# Patient Record
Sex: MALE | Race: WHITE | Employment: OTHER | URBAN - METROPOLITAN AREA
[De-identification: names, ages, dates, MRNs, and addresses within clinical notes are randomized per-mention and may not be internally consistent; named-entity substitution may affect disease eponyms.]

---

## 2022-04-04 ENCOUNTER — APPOINTMENT (OUTPATIENT)
Dept: CT IMAGING | Age: 82
DRG: 057 | End: 2022-04-04
Attending: PHYSICIAN ASSISTANT
Payer: MEDICARE

## 2022-04-04 ENCOUNTER — APPOINTMENT (OUTPATIENT)
Dept: GENERAL RADIOLOGY | Age: 82
DRG: 057 | End: 2022-04-04
Attending: EMERGENCY MEDICINE
Payer: MEDICARE

## 2022-04-04 ENCOUNTER — APPOINTMENT (OUTPATIENT)
Dept: GENERAL RADIOLOGY | Age: 82
DRG: 057 | End: 2022-04-04
Attending: PHYSICIAN ASSISTANT
Payer: MEDICARE

## 2022-04-04 ENCOUNTER — HOSPITAL ENCOUNTER (INPATIENT)
Age: 82
LOS: 2 days | Discharge: HOME HEALTH | DRG: 057 | End: 2022-04-06
Attending: EMERGENCY MEDICINE | Admitting: STUDENT IN AN ORGANIZED HEALTH CARE EDUCATION/TRAINING PROGRAM
Payer: MEDICARE

## 2022-04-04 DIAGNOSIS — I95.1 ORTHOSTATIC HYPOTENSION: Primary | ICD-10-CM

## 2022-04-04 DIAGNOSIS — I25.10 CORONARY ARTERY DISEASE INVOLVING NATIVE CORONARY ARTERY OF NATIVE HEART WITHOUT ANGINA PECTORIS: ICD-10-CM

## 2022-04-04 DIAGNOSIS — I10 ESSENTIAL HYPERTENSION: ICD-10-CM

## 2022-04-04 DIAGNOSIS — R00.1 BRADYCARDIA: ICD-10-CM

## 2022-04-04 PROBLEM — G47.00 INSOMNIA: Status: ACTIVE | Noted: 2022-04-04

## 2022-04-04 LAB
ALBUMIN SERPL-MCNC: 3 G/DL (ref 3.2–4.6)
ALBUMIN/GLOB SERPL: 0.9 (ref 1.2–3.5)
ALP SERPL-CCNC: 76 U/L (ref 50–136)
ALT SERPL-CCNC: 20 U/L (ref 12–65)
ANION GAP SERPL CALC-SCNC: 4 MMOL/L (ref 7–16)
AST SERPL-CCNC: 20 U/L (ref 15–37)
ATRIAL RATE: 61 BPM
BASOPHILS # BLD: 0 K/UL (ref 0–0.2)
BASOPHILS NFR BLD: 1 % (ref 0–2)
BILIRUB SERPL-MCNC: 0.2 MG/DL (ref 0.2–1.1)
BILIRUB UR QL: NEGATIVE
BUN SERPL-MCNC: 16 MG/DL (ref 8–23)
CALCIUM SERPL-MCNC: 8.7 MG/DL (ref 8.3–10.4)
CALCULATED P AXIS, ECG09: 87 DEGREES
CALCULATED R AXIS, ECG10: 79 DEGREES
CALCULATED T AXIS, ECG11: 35 DEGREES
CHLORIDE SERPL-SCNC: 106 MMOL/L (ref 98–107)
CO2 SERPL-SCNC: 30 MMOL/L (ref 21–32)
CREAT SERPL-MCNC: 1 MG/DL (ref 0.8–1.5)
DIAGNOSIS, 93000: NORMAL
DIFFERENTIAL METHOD BLD: ABNORMAL
EOSINOPHIL # BLD: 0.1 K/UL (ref 0–0.8)
EOSINOPHIL NFR BLD: 1 % (ref 0.5–7.8)
ERYTHROCYTE [DISTWIDTH] IN BLOOD BY AUTOMATED COUNT: 13.1 % (ref 11.9–14.6)
GLOBULIN SER CALC-MCNC: 3.5 G/DL (ref 2.3–3.5)
GLUCOSE SERPL-MCNC: 96 MG/DL (ref 65–100)
GLUCOSE UR QL STRIP.AUTO: NEGATIVE MG/DL
HCT VFR BLD AUTO: 38.4 % (ref 41.1–50.3)
HGB BLD-MCNC: 12.6 G/DL (ref 13.6–17.2)
IMM GRANULOCYTES # BLD AUTO: 0 K/UL (ref 0–0.5)
IMM GRANULOCYTES NFR BLD AUTO: 0 % (ref 0–5)
KETONES UR-MCNC: NEGATIVE MG/DL
LEUKOCYTE ESTERASE UR QL STRIP: NEGATIVE
LYMPHOCYTES # BLD: 1.2 K/UL (ref 0.5–4.6)
LYMPHOCYTES NFR BLD: 15 % (ref 13–44)
MAGNESIUM SERPL-MCNC: 2.4 MG/DL (ref 1.8–2.4)
MCH RBC QN AUTO: 34.1 PG (ref 26.1–32.9)
MCHC RBC AUTO-ENTMCNC: 32.8 G/DL (ref 31.4–35)
MCV RBC AUTO: 103.8 FL (ref 79.6–97.8)
MONOCYTES # BLD: 0.6 K/UL (ref 0.1–1.3)
MONOCYTES NFR BLD: 7 % (ref 4–12)
NEUTS SEG # BLD: 6.2 K/UL (ref 1.7–8.2)
NEUTS SEG NFR BLD: 76 % (ref 43–78)
NITRITE UR QL: NEGATIVE
NRBC # BLD: 0 K/UL (ref 0–0.2)
P-R INTERVAL, ECG05: 162 MS
PH UR: 6.5 (ref 5–9)
PLATELET # BLD AUTO: 285 K/UL (ref 150–450)
PMV BLD AUTO: 9.3 FL (ref 9.4–12.3)
POTASSIUM SERPL-SCNC: 4.1 MMOL/L (ref 3.5–5.1)
PROT SERPL-MCNC: 6.5 G/DL (ref 6.3–8.2)
PROT UR QL: NEGATIVE MG/DL
Q-T INTERVAL, ECG07: 429 MS
QRS DURATION, ECG06: 89 MS
QTC CALCULATION (BEZET), ECG08: 433 MS
RBC # BLD AUTO: 3.7 M/UL (ref 4.23–5.6)
RBC # UR STRIP: NEGATIVE
SERVICE CMNT-IMP: ABNORMAL
SODIUM SERPL-SCNC: 140 MMOL/L (ref 138–145)
SP GR UR: 1.02 (ref 1–1.02)
TROPONIN-HIGH SENSITIVITY: 7.3 PG/ML (ref 0–14)
UROBILINOGEN UR QL: 0.2 EU/DL (ref 0.2–1)
VENTRICULAR RATE, ECG03: 61 BPM
WBC # BLD AUTO: 8.1 K/UL (ref 4.3–11.1)

## 2022-04-04 PROCEDURE — 83735 ASSAY OF MAGNESIUM: CPT

## 2022-04-04 PROCEDURE — 85025 COMPLETE CBC W/AUTO DIFF WBC: CPT

## 2022-04-04 PROCEDURE — 80053 COMPREHEN METABOLIC PANEL: CPT

## 2022-04-04 PROCEDURE — 74011250636 HC RX REV CODE- 250/636: Performed by: PHYSICIAN ASSISTANT

## 2022-04-04 PROCEDURE — 70450 CT HEAD/BRAIN W/O DYE: CPT

## 2022-04-04 PROCEDURE — 83036 HEMOGLOBIN GLYCOSYLATED A1C: CPT

## 2022-04-04 PROCEDURE — 74019 RADEX ABDOMEN 2 VIEWS: CPT

## 2022-04-04 PROCEDURE — 99285 EMERGENCY DEPT VISIT HI MDM: CPT

## 2022-04-04 PROCEDURE — 65270000029 HC RM PRIVATE

## 2022-04-04 PROCEDURE — 84439 ASSAY OF FREE THYROXINE: CPT

## 2022-04-04 PROCEDURE — 81003 URINALYSIS AUTO W/O SCOPE: CPT

## 2022-04-04 PROCEDURE — 71045 X-RAY EXAM CHEST 1 VIEW: CPT

## 2022-04-04 PROCEDURE — 84484 ASSAY OF TROPONIN QUANT: CPT

## 2022-04-04 PROCEDURE — 74011000250 HC RX REV CODE- 250: Performed by: STUDENT IN AN ORGANIZED HEALTH CARE EDUCATION/TRAINING PROGRAM

## 2022-04-04 PROCEDURE — 94762 N-INVAS EAR/PLS OXIMTRY CONT: CPT

## 2022-04-04 PROCEDURE — 84443 ASSAY THYROID STIM HORMONE: CPT

## 2022-04-04 PROCEDURE — 74011250637 HC RX REV CODE- 250/637: Performed by: STUDENT IN AN ORGANIZED HEALTH CARE EDUCATION/TRAINING PROGRAM

## 2022-04-04 PROCEDURE — 93005 ELECTROCARDIOGRAM TRACING: CPT | Performed by: EMERGENCY MEDICINE

## 2022-04-04 PROCEDURE — 96360 HYDRATION IV INFUSION INIT: CPT

## 2022-04-04 RX ORDER — ONDANSETRON 4 MG/1
4 TABLET, ORALLY DISINTEGRATING ORAL
Status: DISCONTINUED | OUTPATIENT
Start: 2022-04-04 | End: 2022-04-06 | Stop reason: HOSPADM

## 2022-04-04 RX ORDER — SODIUM CHLORIDE 0.9 % (FLUSH) 0.9 %
5-10 SYRINGE (ML) INJECTION AS NEEDED
Status: DISCONTINUED | OUTPATIENT
Start: 2022-04-04 | End: 2022-04-04 | Stop reason: SDUPTHER

## 2022-04-04 RX ORDER — ONDANSETRON 2 MG/ML
4 INJECTION INTRAMUSCULAR; INTRAVENOUS
Status: DISCONTINUED | OUTPATIENT
Start: 2022-04-04 | End: 2022-04-06 | Stop reason: HOSPADM

## 2022-04-04 RX ORDER — ASPIRIN 81 MG/1
81 TABLET ORAL DAILY
Status: DISCONTINUED | OUTPATIENT
Start: 2022-04-05 | End: 2022-04-06 | Stop reason: HOSPADM

## 2022-04-04 RX ORDER — PRAVASTATIN SODIUM 20 MG/1
20 TABLET ORAL
Status: DISCONTINUED | OUTPATIENT
Start: 2022-04-04 | End: 2022-04-06 | Stop reason: HOSPADM

## 2022-04-04 RX ORDER — ACETAMINOPHEN 325 MG/1
650 TABLET ORAL
Status: DISCONTINUED | OUTPATIENT
Start: 2022-04-04 | End: 2022-04-06 | Stop reason: HOSPADM

## 2022-04-04 RX ORDER — CHOLECALCIFEROL TAB 125 MCG (5000 UNIT) 125 MCG
5000 TAB ORAL DAILY
Status: DISCONTINUED | OUTPATIENT
Start: 2022-04-05 | End: 2022-04-06 | Stop reason: HOSPADM

## 2022-04-04 RX ORDER — SODIUM CHLORIDE 0.9 % (FLUSH) 0.9 %
5-40 SYRINGE (ML) INJECTION EVERY 8 HOURS
Status: DISCONTINUED | OUTPATIENT
Start: 2022-04-04 | End: 2022-04-06 | Stop reason: HOSPADM

## 2022-04-04 RX ORDER — ZOLPIDEM TARTRATE 5 MG/1
5 TABLET ORAL
Status: DISCONTINUED | OUTPATIENT
Start: 2022-04-04 | End: 2022-04-06 | Stop reason: HOSPADM

## 2022-04-04 RX ORDER — ACETAMINOPHEN 650 MG/1
650 SUPPOSITORY RECTAL
Status: DISCONTINUED | OUTPATIENT
Start: 2022-04-04 | End: 2022-04-06 | Stop reason: HOSPADM

## 2022-04-04 RX ORDER — POLYETHYLENE GLYCOL 3350 17 G/17G
17 POWDER, FOR SOLUTION ORAL DAILY PRN
Status: DISCONTINUED | OUTPATIENT
Start: 2022-04-04 | End: 2022-04-06 | Stop reason: HOSPADM

## 2022-04-04 RX ORDER — SODIUM CHLORIDE 0.9 % (FLUSH) 0.9 %
5-40 SYRINGE (ML) INJECTION AS NEEDED
Status: DISCONTINUED | OUTPATIENT
Start: 2022-04-04 | End: 2022-04-06 | Stop reason: HOSPADM

## 2022-04-04 RX ORDER — FLUTICASONE PROPIONATE 50 MCG
2 SPRAY, SUSPENSION (ML) NASAL DAILY
Status: DISCONTINUED | OUTPATIENT
Start: 2022-04-05 | End: 2022-04-06 | Stop reason: HOSPADM

## 2022-04-04 RX ORDER — SODIUM CHLORIDE 0.9 % (FLUSH) 0.9 %
5-10 SYRINGE (ML) INJECTION EVERY 8 HOURS
Status: DISCONTINUED | OUTPATIENT
Start: 2022-04-04 | End: 2022-04-04 | Stop reason: SDUPTHER

## 2022-04-04 RX ORDER — FINASTERIDE 5 MG/1
5 TABLET, FILM COATED ORAL DAILY
Status: DISCONTINUED | OUTPATIENT
Start: 2022-04-05 | End: 2022-04-06 | Stop reason: HOSPADM

## 2022-04-04 RX ORDER — ENOXAPARIN SODIUM 100 MG/ML
40 INJECTION SUBCUTANEOUS DAILY
Status: DISCONTINUED | OUTPATIENT
Start: 2022-04-05 | End: 2022-04-06 | Stop reason: HOSPADM

## 2022-04-04 RX ADMIN — PRAVASTATIN SODIUM 20 MG: 20 TABLET ORAL at 22:33

## 2022-04-04 RX ADMIN — SODIUM CHLORIDE, PRESERVATIVE FREE 10 ML: 5 INJECTION INTRAVENOUS at 22:34

## 2022-04-04 RX ADMIN — SODIUM CHLORIDE 1000 ML: 900 INJECTION, SOLUTION INTRAVENOUS at 16:42

## 2022-04-04 NOTE — ED TRIAGE NOTES
Pt to triage with mask in place. Pt reports low blood pressure and dizziness. Pt report stopping lisinopril on 3/20 but has had persistent sx. Pt reports being orthostatic. Pt also reports some shob.

## 2022-04-04 NOTE — ED PROVIDER NOTES
Patient to ER with friend of the family who reports continued hypotension. Patient seen by primary care physician for same over the past 3 to 4 weeks. Blood pressure has been in the 90s. Blood pressure medications were discontinued. He is also having some slow weight loss. Patient states he still eats well. Denies any vomiting no diarrhea no constipation. Is awaiting referral to neurologist for some mild neurologic changes    The history is provided by the patient. Hypotension  This is a new problem. The current episode started more than 1 week ago. The problem occurs constantly. The problem has not changed since onset. Pertinent negatives include no chest pain, no abdominal pain, no headaches and no shortness of breath. The symptoms are aggravated by standing. The symptoms are relieved by rest. Treatments tried: Discontinued blood pressure medicines. The treatment provided no relief.         Past Medical History:   Diagnosis Date    Bradycardia     Chest pain, unspecified 2016    Coronary disease     Essential hypertension 2016    Lower extremity neuropathy     Mixed hyperlipidemia 2016    Myalgia        Past Surgical History:   Procedure Laterality Date    HX HEART CATHETERIZATION           Family History:   Problem Relation Age of Onset    Heart Attack Father 47       Social History     Socioeconomic History    Marital status:      Spouse name: Not on file    Number of children: Not on file    Years of education: Not on file    Highest education level: Not on file   Occupational History    Not on file   Tobacco Use    Smoking status: Former Smoker     Packs/day: 1.50     Years: 50.00     Pack years: 75.00     Quit date: 2012     Years since quittin.8    Smokeless tobacco: Never Used    Tobacco comment: smoked for 40 to 50 years, previously smoked 1 1/2 PPD, stopped 3 years ago   Substance and Sexual Activity    Alcohol use: No     Alcohol/week: 0.0 standard drinks    Drug use: Not on file    Sexual activity: Not on file   Other Topics Concern    Not on file   Social History Narrative    Not on file     Social Determinants of Health     Financial Resource Strain:     Difficulty of Paying Living Expenses: Not on file   Food Insecurity:     Worried About Running Out of Food in the Last Year: Not on file    Kwame of Food in the Last Year: Not on file   Transportation Needs:     Lack of Transportation (Medical): Not on file    Lack of Transportation (Non-Medical): Not on file   Physical Activity:     Days of Exercise per Week: Not on file    Minutes of Exercise per Session: Not on file   Stress:     Feeling of Stress : Not on file   Social Connections:     Frequency of Communication with Friends and Family: Not on file    Frequency of Social Gatherings with Friends and Family: Not on file    Attends Ep[employer] Services: Not on file    Active Member of [address] [place] [address] [employer] or Organizations: Not on file    Attends Club or Organization Meetings: Not on file    Marital Status: Not on file   Intimate Partner Violence:     Fear of Current or Ex-Partner: Not on file    Emotionally Abused: Not on file    Physically Abused: Not on file    Sexually Abused: Not on file   Housing Stability:     Unable to Pay for Housing in the Last Year: Not on file    Number of [place] in the Last Year: Not on file    Unstable Housing in the Last Year: Not on file         ALLERGIES: Patient has no known allergies. Review of Systems   Respiratory: Negative for shortness of breath. Cardiovascular: Negative for chest pain. Gastrointestinal: Negative for abdominal pain. Neurological: Negative for headaches. All other systems reviewed and are negative.       Vitals:    04/04/22 1422 04/04/22 1700   BP: 94/78 (!) 185/77   Pulse: 84 (!) 54   Resp: 20 22   Temp: 98.2 °F (36.8 °C)    SpO2: 100%    Weight: 68.9 kg (152 lb)    Height: 6' (1.829 m)             Physical Exam  Vitals and nursing note reviewed. Constitutional:       General: He is not in acute distress. Appearance: Normal appearance. He is well-developed. He is not diaphoretic. Comments: thin   HENT:      Head: Normocephalic and atraumatic. Right Ear: External ear normal.      Left Ear: External ear normal.      Nose: Nose normal.      Mouth/Throat:      Mouth: Mucous membranes are moist.   Eyes:      Pupils: Pupils are equal, round, and reactive to light. Cardiovascular:      Rate and Rhythm: Normal rate and regular rhythm. Pulmonary:      Effort: Pulmonary effort is normal.      Breath sounds: Normal breath sounds. No wheezing or rhonchi. Abdominal:      General: Abdomen is flat. Bowel sounds are normal.      Palpations: Abdomen is soft. There is no mass. Tenderness: There is abdominal tenderness. Hernia: No hernia is present. Comments: llq area    Musculoskeletal:         General: No swelling. Normal range of motion. Cervical back: Normal range of motion and neck supple. Skin:     General: Skin is warm. Neurological:      General: No focal deficit present. Mental Status: He is alert. Comments: Patient oriented to person and place.   Patient is easily agitated    Psychiatric:         Judgment: Judgment normal.          MDM  Number of Diagnoses or Management Options  Diagnosis management comments: Labs Reviewed  CBC WITH AUTOMATED DIFF - Abnormal; Notable for the following components:     RBC                           3.70 (*)               HGB                           12.6 (*)               HCT                           38.4 (*)               MCV                           103.8 (*)               MCH                           34.1 (*)               MPV                           9.3 (*)             All other components within normal limits  METABOLIC PANEL, COMPREHENSIVE - Abnormal; Notable for the following components:     Anion gap                     4 (*) Albumin                       3.0 (*)                A-G Ratio                     0.9 (*)             All other components within normal limits  POC URINE MACROSCOPIC - Abnormal; Notable for the following components:     Spec. gravity (POC)           1.025 (*)            All other components within normal limits  MAGNESIUM  TROPONIN-HIGH SENSITIVITY  XR ABD (AP AND ERECT OR DECUB)   Final Result    1. Nondistended bowel gas pattern     CT HEAD WO CONT   Final Result    Moderate cerebral volume loss and white matter findings compatible    with chronic small vessel ischemic disease. XR CHEST PORT   Final Result    Hyperexpanded lungs, otherwise no acute abnormality. Given 1 L normal saline urine dip negative. Repeat orthostatics patient remained symptomatic on standing.  Will  Discusse with hospitalist    EKG interpretation shows normal sinus rhythm at 82 bpm nonspecific ST-T wave changes noted no signs of acute MI       Amount and/or Complexity of Data Reviewed  Clinical lab tests: ordered and reviewed  Tests in the radiology section of CPT®: ordered and reviewed  Review and summarize past medical records: yes  Discuss the patient with other providers: yes  Independent visualization of images, tracings, or specimens: yes    Risk of Complications, Morbidity, and/or Mortality  Presenting problems: moderate  Diagnostic procedures: moderate  Management options: moderate    Patient Progress  Patient progress: stable         Procedures

## 2022-04-04 NOTE — Clinical Note
Status[de-identified] INPATIENT [101]   Type of Bed: Medical [8]   Cardiac Monitoring Required?: Yes   Inpatient Hospitalization Certified Necessary for the Following Reasons: 3. Patient receiving treatment that can only be provided in an inpatient setting (further clarification in H&P documentation)   Admitting Diagnosis: Orthostatic hypotension [458. 0. ICD-9-CM]   Admitting Physician: Ganga Carvalho [[id number]]   Attending Physician: Cherise Hinojosa   Estimated Length of Stay: 2 Midnights   Discharge Plan[de-identified] [facility]

## 2022-04-05 ENCOUNTER — APPOINTMENT (OUTPATIENT)
Dept: ULTRASOUND IMAGING | Age: 82
DRG: 057 | End: 2022-04-05
Attending: STUDENT IN AN ORGANIZED HEALTH CARE EDUCATION/TRAINING PROGRAM
Payer: MEDICARE

## 2022-04-05 ENCOUNTER — APPOINTMENT (OUTPATIENT)
Dept: NON INVASIVE DIAGNOSTICS | Age: 82
DRG: 057 | End: 2022-04-05
Attending: STUDENT IN AN ORGANIZED HEALTH CARE EDUCATION/TRAINING PROGRAM
Payer: MEDICARE

## 2022-04-05 PROBLEM — I25.10 CAD (CORONARY ARTERY DISEASE): Status: ACTIVE | Noted: 2022-04-05

## 2022-04-05 LAB
ALBUMIN SERPL-MCNC: 3.2 G/DL (ref 3.2–4.6)
ALBUMIN/GLOB SERPL: 0.9 (ref 1.2–3.5)
ALP SERPL-CCNC: 70 U/L (ref 50–136)
ALT SERPL-CCNC: 16 U/L (ref 12–65)
ANION GAP SERPL CALC-SCNC: 2 MMOL/L (ref 7–16)
APTT PPP: 33.5 SEC (ref 24.1–35.1)
AST SERPL-CCNC: 22 U/L (ref 15–37)
BASOPHILS # BLD: 0 K/UL (ref 0–0.2)
BASOPHILS NFR BLD: 1 % (ref 0–2)
BILIRUB SERPL-MCNC: 0.5 MG/DL (ref 0.2–1.1)
BUN SERPL-MCNC: 12 MG/DL (ref 8–23)
CALCIUM SERPL-MCNC: 8.7 MG/DL (ref 8.3–10.4)
CHLORIDE SERPL-SCNC: 105 MMOL/L (ref 98–107)
CO2 SERPL-SCNC: 33 MMOL/L (ref 21–32)
CREAT SERPL-MCNC: 0.7 MG/DL (ref 0.8–1.5)
DIFFERENTIAL METHOD BLD: ABNORMAL
ECHO AO ASC DIAM: 3.4 CM
ECHO AO ASCENDING AORTA INDEX: 1.91 CM/M2
ECHO AO ROOT DIAM: 3.7 CM
ECHO AO ROOT INDEX: 2.08 CM/M2
ECHO AV AREA PEAK VELOCITY: 2.6 CM2
ECHO AV AREA VTI: 2.7 CM2
ECHO AV AREA/BSA PEAK VELOCITY: 1.5 CM2/M2
ECHO AV AREA/BSA VTI: 1.5 CM2/M2
ECHO AV MEAN GRADIENT: 3 MMHG
ECHO AV MEAN VELOCITY: 0.8 M/S
ECHO AV PEAK GRADIENT: 7 MMHG
ECHO AV PEAK VELOCITY: 1.4 M/S
ECHO AV VELOCITY RATIO: 0.71
ECHO AV VTI: 28.3 CM
ECHO EST RA PRESSURE: 3 MMHG
ECHO IVC PROX: 1.5 CM
ECHO LA AREA 2C: 16.6 CM2
ECHO LA AREA 4C: 15.9 CM2
ECHO LA DIAMETER INDEX: 1.74 CM/M2
ECHO LA DIAMETER: 3.1 CM
ECHO LA MAJOR AXIS: 4.6 CM
ECHO LA MINOR AXIS: 4.4 CM
ECHO LA TO AORTIC ROOT RATIO: 0.84
ECHO LA VOL BP: 46 ML (ref 18–58)
ECHO LA VOL/BSA BIPLANE: 26 ML/M2 (ref 16–34)
ECHO LV E' LATERAL VELOCITY: 9 CM/S
ECHO LV E' SEPTAL VELOCITY: 8 CM/S
ECHO LV EDV A2C: 95 ML
ECHO LV EDV A4C: 87 ML
ECHO LV EDV INDEX A4C: 49 ML/M2
ECHO LV EDV NDEX A2C: 53 ML/M2
ECHO LV EJECTION FRACTION A2C: 50 %
ECHO LV EJECTION FRACTION A4C: 42 %
ECHO LV EJECTION FRACTION BIPLANE: 47 % (ref 55–100)
ECHO LV ESV A2C: 48 ML
ECHO LV ESV A4C: 50 ML
ECHO LV ESV INDEX A2C: 27 ML/M2
ECHO LV ESV INDEX A4C: 28 ML/M2
ECHO LV FRACTIONAL SHORTENING: 27 % (ref 28–44)
ECHO LV INTERNAL DIMENSION DIASTOLE INDEX: 2.7 CM/M2
ECHO LV INTERNAL DIMENSION DIASTOLIC: 4.8 CM (ref 4.2–5.9)
ECHO LV INTERNAL DIMENSION SYSTOLIC INDEX: 1.97 CM/M2
ECHO LV INTERNAL DIMENSION SYSTOLIC: 3.5 CM
ECHO LV IVSD: 0.9 CM (ref 0.6–1)
ECHO LV MASS 2D: 158.8 G (ref 88–224)
ECHO LV MASS INDEX 2D: 89.2 G/M2 (ref 49–115)
ECHO LV POSTERIOR WALL DIASTOLIC: 1 CM (ref 0.6–1)
ECHO LV RELATIVE WALL THICKNESS RATIO: 0.42
ECHO LVOT AREA: 3.5 CM2
ECHO LVOT AV VTI INDEX: 0.78
ECHO LVOT DIAM: 2.1 CM
ECHO LVOT MEAN GRADIENT: 2 MMHG
ECHO LVOT PEAK GRADIENT: 4 MMHG
ECHO LVOT PEAK VELOCITY: 1 M/S
ECHO LVOT STROKE VOLUME INDEX: 42.8 ML/M2
ECHO LVOT SV: 76.2 ML
ECHO LVOT VTI: 22 CM
ECHO MV A VELOCITY: 0.76 M/S
ECHO MV AREA VTI: 3.3 CM2
ECHO MV E DECELERATION TIME (DT): 298 MS
ECHO MV E VELOCITY: 0.52 M/S
ECHO MV E/A RATIO: 0.68
ECHO MV E/E' LATERAL: 5.78
ECHO MV E/E' RATIO (AVERAGED): 6.14
ECHO MV E/E' SEPTAL: 6.5
ECHO MV LVOT VTI INDEX: 1.06
ECHO MV MAX VELOCITY: 1 M/S
ECHO MV MEAN GRADIENT: 2 MMHG
ECHO MV MEAN VELOCITY: 0.6 M/S
ECHO MV PEAK GRADIENT: 4 MMHG
ECHO MV REGURGITANT PEAK GRADIENT: 180 MMHG
ECHO MV REGURGITANT PEAK VELOCITY: 6.7 M/S
ECHO MV REGURGITANT VTIA: 196 CM
ECHO MV VTI: 23.4 CM
ECHO PV ACCELERATION TIME (AT): 121 MS
ECHO PV MAX VELOCITY: 0.9 M/S
ECHO PV PEAK GRADIENT: 3 MMHG
ECHO RIGHT VENTRICULAR SYSTOLIC PRESSURE (RVSP): 31 MMHG
ECHO RV BASAL DIMENSION: 3.2 CM
ECHO RV INTERNAL DIMENSION: 3.8 CM
ECHO RV TAPSE: 1.8 CM (ref 1.5–2)
ECHO TV REGURGITANT MAX VELOCITY: 2.66 M/S
ECHO TV REGURGITANT PEAK GRADIENT: 28 MMHG
EOSINOPHIL # BLD: 0.1 K/UL (ref 0–0.8)
EOSINOPHIL NFR BLD: 2 % (ref 0.5–7.8)
ERYTHROCYTE [DISTWIDTH] IN BLOOD BY AUTOMATED COUNT: 13.1 % (ref 11.9–14.6)
EST. AVERAGE GLUCOSE BLD GHB EST-MCNC: 111 MG/DL
GLOBULIN SER CALC-MCNC: 3.4 G/DL (ref 2.3–3.5)
GLUCOSE SERPL-MCNC: 82 MG/DL (ref 65–100)
HBA1C MFR BLD: 5.5 % (ref 4.2–6.3)
HCT VFR BLD AUTO: 38.6 % (ref 41.1–50.3)
HGB BLD-MCNC: 12.8 G/DL (ref 13.6–17.2)
IMM GRANULOCYTES # BLD AUTO: 0 K/UL (ref 0–0.5)
IMM GRANULOCYTES NFR BLD AUTO: 0 % (ref 0–5)
INR PPP: 0.9
LYMPHOCYTES # BLD: 1.5 K/UL (ref 0.5–4.6)
LYMPHOCYTES NFR BLD: 22 % (ref 13–44)
MAGNESIUM SERPL-MCNC: 2.3 MG/DL (ref 1.8–2.4)
MCH RBC QN AUTO: 33.6 PG (ref 26.1–32.9)
MCHC RBC AUTO-ENTMCNC: 33.2 G/DL (ref 31.4–35)
MCV RBC AUTO: 101.3 FL (ref 79.6–97.8)
MONOCYTES # BLD: 0.5 K/UL (ref 0.1–1.3)
MONOCYTES NFR BLD: 7 % (ref 4–12)
NEUTS SEG # BLD: 4.6 K/UL (ref 1.7–8.2)
NEUTS SEG NFR BLD: 69 % (ref 43–78)
NRBC # BLD: 0 K/UL (ref 0–0.2)
PLATELET # BLD AUTO: 291 K/UL (ref 150–450)
PMV BLD AUTO: 10 FL (ref 9.4–12.3)
POTASSIUM SERPL-SCNC: 3.6 MMOL/L (ref 3.5–5.1)
PROT SERPL-MCNC: 6.6 G/DL (ref 6.3–8.2)
PROTHROMBIN TIME: 12.7 SEC (ref 12.6–14.5)
RBC # BLD AUTO: 3.81 M/UL (ref 4.23–5.6)
SODIUM SERPL-SCNC: 140 MMOL/L (ref 138–145)
T4 FREE SERPL-MCNC: 1 NG/DL (ref 0.78–1.46)
TSH SERPL DL<=0.005 MIU/L-ACNC: 0.88 UIU/ML (ref 0.36–3.74)
WBC # BLD AUTO: 6.7 K/UL (ref 4.3–11.1)

## 2022-04-05 PROCEDURE — 85025 COMPLETE CBC W/AUTO DIFF WBC: CPT

## 2022-04-05 PROCEDURE — 99222 1ST HOSP IP/OBS MODERATE 55: CPT | Performed by: INTERNAL MEDICINE

## 2022-04-05 PROCEDURE — 93880 EXTRACRANIAL BILAT STUDY: CPT

## 2022-04-05 PROCEDURE — 83521 IG LIGHT CHAINS FREE EACH: CPT

## 2022-04-05 PROCEDURE — 82784 ASSAY IGA/IGD/IGG/IGM EACH: CPT

## 2022-04-05 PROCEDURE — 36415 COLL VENOUS BLD VENIPUNCTURE: CPT

## 2022-04-05 PROCEDURE — 65270000029 HC RM PRIVATE

## 2022-04-05 PROCEDURE — 93306 TTE W/DOPPLER COMPLETE: CPT

## 2022-04-05 PROCEDURE — 97161 PT EVAL LOW COMPLEX 20 MIN: CPT

## 2022-04-05 PROCEDURE — 80053 COMPREHEN METABOLIC PANEL: CPT

## 2022-04-05 PROCEDURE — 74011000250 HC RX REV CODE- 250: Performed by: INTERNAL MEDICINE

## 2022-04-05 PROCEDURE — 97165 OT EVAL LOW COMPLEX 30 MIN: CPT

## 2022-04-05 PROCEDURE — 74011000250 HC RX REV CODE- 250: Performed by: STUDENT IN AN ORGANIZED HEALTH CARE EDUCATION/TRAINING PROGRAM

## 2022-04-05 PROCEDURE — 83735 ASSAY OF MAGNESIUM: CPT

## 2022-04-05 PROCEDURE — 74011250637 HC RX REV CODE- 250/637: Performed by: STUDENT IN AN ORGANIZED HEALTH CARE EDUCATION/TRAINING PROGRAM

## 2022-04-05 PROCEDURE — 86580 TB INTRADERMAL TEST: CPT | Performed by: INTERNAL MEDICINE

## 2022-04-05 PROCEDURE — 85730 THROMBOPLASTIN TIME PARTIAL: CPT

## 2022-04-05 PROCEDURE — 97116 GAIT TRAINING THERAPY: CPT

## 2022-04-05 PROCEDURE — 74011250636 HC RX REV CODE- 250/636: Performed by: STUDENT IN AN ORGANIZED HEALTH CARE EDUCATION/TRAINING PROGRAM

## 2022-04-05 PROCEDURE — 97535 SELF CARE MNGMENT TRAINING: CPT

## 2022-04-05 PROCEDURE — 85610 PROTHROMBIN TIME: CPT

## 2022-04-05 PROCEDURE — 74011250637 HC RX REV CODE- 250/637: Performed by: INTERNAL MEDICINE

## 2022-04-05 RX ORDER — ISOSORBIDE DINITRATE 10 MG/1
5 TABLET ORAL
Status: DISCONTINUED | OUTPATIENT
Start: 2022-04-05 | End: 2022-04-06 | Stop reason: HOSPADM

## 2022-04-05 RX ORDER — ISOSORBIDE DINITRATE 10 MG/1
10 TABLET ORAL
Status: DISCONTINUED | OUTPATIENT
Start: 2022-04-05 | End: 2022-04-05

## 2022-04-05 RX ADMIN — SODIUM CHLORIDE, PRESERVATIVE FREE 10 ML: 5 INJECTION INTRAVENOUS at 21:05

## 2022-04-05 RX ADMIN — PRAVASTATIN SODIUM 20 MG: 20 TABLET ORAL at 21:05

## 2022-04-05 RX ADMIN — FINASTERIDE 5 MG: 5 TABLET, FILM COATED ORAL at 08:45

## 2022-04-05 RX ADMIN — SODIUM CHLORIDE, PRESERVATIVE FREE 10 ML: 5 INJECTION INTRAVENOUS at 15:03

## 2022-04-05 RX ADMIN — FLUTICASONE PROPIONATE 2 SPRAY: 50 SPRAY, METERED NASAL at 08:46

## 2022-04-05 RX ADMIN — ISOSORBIDE DINITRATE 5 MG: 10 TABLET ORAL at 21:05

## 2022-04-05 RX ADMIN — ASPIRIN 81 MG: 81 TABLET ORAL at 08:45

## 2022-04-05 RX ADMIN — SODIUM CHLORIDE, PRESERVATIVE FREE 10 ML: 5 INJECTION INTRAVENOUS at 06:54

## 2022-04-05 RX ADMIN — POLYETHYLENE GLYCOL 3350 17 G: 17 POWDER, FOR SOLUTION ORAL at 16:58

## 2022-04-05 RX ADMIN — Medication 5000 UNITS: at 08:46

## 2022-04-05 RX ADMIN — ENOXAPARIN SODIUM 40 MG: 40 INJECTION SUBCUTANEOUS at 08:46

## 2022-04-05 RX ADMIN — TUBERCULIN PURIFIED PROTEIN DERIVATIVE 5 UNITS: 5 INJECTION, SOLUTION INTRADERMAL at 15:57

## 2022-04-05 NOTE — H&P
[facility] Initial Cardiac Evaluation                 Date of  Admission: 4/4/2022  4:01 PM     Primary Care Physician: Chandan Duron MD  Primary Cardiologist: Dr Gordon Velasquez  Referring Physician: Dr Indira Jorge  Attending Physician: Dr Aureliano Woodall    CC: orthostatic hypotension       Marylene Lark is a 80 y.o. male admitted for Orthostatic hypotension [I95.1]. History of mild to moderate coronary disease. Noted heart cath in 2009 with some ostial diagonal disease (~50%), mild to moderate mid LAD disease [test done for reported positive stress/chest pain]. Has had a subsequent Cardiolite which was negative in 2011. Also prior seen by neurology for LE neuropathy and had a CHOCO noted per records done with an abnormal MRI per patient; stated possible CVA (no sx to suggest it). 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 Prior myalgias with vytorin but tolerating pravastatin; thinks some increased sx with increasing dose prior. Echo noted from [facility] with reported preserved EF and stated mild mitral stenosis [[place]; pressure halftime/gradients noted would not be consistent;  8/18]; repeat echo here with preserved EF and mild to moderate MR; no noted mitral stenosis [3/20]. Noted Cardiolite at [facility] with fixed inferior defect [1/2020; appears presented there with left arm pain]. Pt w orthostatic symptoms but lisinopril dose decreased and improved. H/O orthostatic hypotension, neuropathy w monoclonal MGUS    1-2 weeks ago pt got more dizziness, only w sitting up or standing, felt light headed and wobbly. No syncope. No symptoms at rest.  Tries to stand slowly. Drinking well, no weight loss, no N/V or diarrhea. No CP, SOB or LE edema. Rare palpitations. Just now laying BP was 174/84, sitting up /57, no symptoms with this drop today. Presented to the ER 4-4 w dizziness and hypotension. Lisinopril stopped 3-20-22.   In ER SBP dropped form 180 to 90 with standing. CBC and CMP wnl, HS trop 7, TSH .8.  /77, EKG NSR w rate 82 w NSST/T wave changes. On  Monitor remains NSR. Given a liter of fluids, today BP laying 174/84 sitting up dropped to 110/57. No meds for hypotension, never worn support hose.      Patient Active Problem List   Diagnosis Code    Mixed hyperlipidemia E78.2    Essential hypertension I10    Chest pain, unspecified R07.9    Orthostatic hypotension I95.1    Bradycardia R00.1    Insomnia G47.00       Past Medical History:   Diagnosis Date    Bradycardia     Chest pain, unspecified 2016    Coronary disease     Essential hypertension 2016    Lower extremity neuropathy     Mixed hyperlipidemia 2016    Myalgia       Past Surgical History:   Procedure Laterality Date    HX HEART CATHETERIZATION       No Known Allergies   Family History   Problem Relation Age of Onset    Heart Attack Father 47      Social History     Tobacco Use    Smoking status: Former Smoker     Packs/day: 1.50     Years: 50.00     Pack years: 75.00     Quit date: 2012     Years since quittin.8    Smokeless tobacco: Never Used    Tobacco comment: smoked for 40 to 50 years, previously smoked 1 1/2 PPD, stopped 3 years ago   Substance Use Topics    Alcohol use: No     Alcohol/week: 0.0 standard drinks        Current Facility-Administered Medications   Medication Dose Route Frequency    aspirin delayed-release tablet 81 mg  81 mg Oral DAILY    cholecalciferol (VITAMIN D3) (5000 Units/125 mcg) tablet 5,000 Units  5,000 Units Oral DAILY    finasteride (PROSCAR) tablet 5 mg  5 mg Oral DAILY    fluticasone propionate (FLONASE) 50 mcg/actuation nasal spray 2 Spray  2 Spray Both Nostrils DAILY    pravastatin (PRAVACHOL) tablet 20 mg  20 mg Oral QHS    zolpidem (AMBIEN) tablet 5 mg  5 mg Oral QHS PRN    sodium chloride (NS) flush 5-40 mL  5-40 mL IntraVENous Q8H    sodium chloride (NS) flush 5-40 mL  5-40 mL IntraVENous PRN    acetaminophen (TYLENOL) tablet 650 mg  650 mg Oral Q6H PRN    Or    acetaminophen (TYLENOL) suppository 650 mg  650 mg Rectal Q6H PRN    polyethylene glycol (MIRALAX) packet 17 g  17 g Oral DAILY PRN    ondansetron (ZOFRAN ODT) tablet 4 mg  4 mg Oral Q8H PRN    Or    ondansetron (ZOFRAN) injection 4 mg  4 mg IntraVENous Q6H PRN    enoxaparin (LOVENOX) injection 40 mg  40 mg SubCUTAneous DAILY       Review of Symptoms:  General: no weight change,  + wobbly/ weakness, fever or chills  Skin: no rashes, lumps, or other skin changes  HEENT: no headache, dizziness, lightheadedness, vision changes, hearing changes, tinnitus, vertigo, sinus pressure/pain, bleeding gums, sore throat, or hoarseness  Neck: no swollen glands, goiter, pain or stiffness  Respiratory: no cough, sputum, hemoptysis, no dyspnea, wheezing  Cardiovascular: + as per HPI  Gastrointestinal: + constipation   Urinary: + BPH  Peripheral Vascular: no claudication, leg cramps, prior DVTs, swelling of calves, legs, or feet, color change, or swelling with redness or tenderness  Musculoskeletal: no muscle or joint pain/stiffness, joint swelling, erythema of joints, or back pain  Psychiatric: no depression or excessive stress  Neurological: + neuropathy   Hematologic: no anemia, easy bruising or bleeding  Endocrine: no thyroid problems, heat or cold intolerance, excessive sweating, polyuria, polydipsia, no  diabetes.        Physical Exam  Vitals:    04/04/22 2015 04/04/22 2104 04/04/22 2316 04/05/22 0317   BP: (!) 154/78 (!) 147/94 136/65 134/77   Pulse:  72 66 75   Resp:  16 16 16   Temp:  97.8 °F (36.6 °C) 97.7 °F (36.5 °C) 97.5 °F (36.4 °C)   SpO2:  100% 99% 99%   Weight:  59.6 kg (131 lb 6.4 oz)     Height:           Physical Exam:  General: Well Developed, Well Nourished, No Acute Distress, frail, appears stated age   Head: normocephalic atraumatic   ENT: pupils equal and round, no abnormalities noted  Neck: supple, no JVD, no carotid bruits  Heart: S1S2 with RRR   Lungs: Clear throughout auscultation bilaterally without adventitious sounds  Abd: soft, nontender, nondistended, with good bowel sounds  Ext: warm, no edema  Skin: warm and dry  Psychiatric: Normal mood and affect, A&O x 3  Neurologic: normal muscle tone      Cardiographics    Telemetry: NSR     Labs:   Recent Labs     04/04/22  1430      K 4.1   MG 2.4   BUN 16   CREA 1.00   GLU 96   WBC 8.1   HGB 12.6*   HCT 38.4*           Assessment/Plan:     Assessment:   Orthostatic hypotension (4/4/2022)- continued symptoms despite hydration and stopping ACE  - add support hose  - encourage hydration  - consider midodrine if still symptomatic  - discussed conservative measures, staying hydrated, standing slowly  - may need to stop FINASTERIDE if able     Mixed hyperlipidemia (5/19/2016)- statin    Essential hypertension (5/19/2016)- permissive hypertension due to severe orthostatic symptoms     Bradycardia (4/4/2022)- no significant bradycardia on tele, monitor    Insomnia (4/4/2022)    CAD (coronary artery disease) (4/5/2022)- cath 2009 mild - mod disease  - ASA, statin     Thank you very much for this referral. We appreciate the opportunity to participate in this patient's care. We will follow along with above stated plan.     Sherry Lr PA-C  Consulting MD: Radha Whiting

## 2022-04-05 NOTE — PROGRESS NOTES
04/04/22 2223   Dual Skin Pressure Injury Assessment   Dual Skin Pressure Injury Assessment WDL   Second Care Provider (Based on [address] [place] [address]) Arvin Curling, RN      Patients skin was intact and there are no apparent problems at this time.

## 2022-04-05 NOTE — PROGRESS NOTES
ACUTE PHYSICAL THERAPY GOALS:  (Developed with and agreed upon by patient and/or caregiver. Goals:  (1.)Mr. Hardy Begum will ambulate with SUPERVISION for 300 feet with the least restrictive device within 2 treatment day(s). (2.)Mr. Hardy Begum will participate in seated and standing exercises to improve strength and steadiness with gait within 2 day(s)    PHYSICAL THERAPY ASSESSMENT: Initial Assessment, Daily Note and AM PT Treatment Day # 1      Cassie Flowers is a 80 y.o. male   PRIMARY DIAGNOSIS: Orthostatic hypotension  Orthostatic hypotension [I95.1]       Reason for Referral:    ICD-10: Treatment Diagnosis: Difficulty in walking, Not elsewhere classified (R26.2)  [place] / [place] / Product Type: Medicare /     ASSESSMENT:     REHAB RECOMMENDATIONS:   Recommendation to date pending progress:  Setting:   No further skilled therapy after discharge from hospital  Equipment:    None     PRIOR LEVEL OF FUNCTION:  (Prior to Hospitalization) INITIAL/CURRENT LEVEL OF FUNCTION:  (Most Recently Demonstrated)   Bed Mobility:   Independent  Sit to Stand:   Independent  Transfers:   Independent  Gait/Mobility:   Independent Bed Mobility:   Independent  Sit to Stand:   Supervision  Transfers:   Supervision  Gait/Mobility:  Renzo Shell Assistance     ASSESSMENT:  Mr. Hardy Begum is a 80year old WM with an admitting diagnosis of orthostatic hypotension. His PMH includes orthostatic intolerance, bradycardia, hypertension, hyperlipidemia and Ménière's disease. He presents today sitting up in the bed agreeable to have therapy. He appears slightly anxious and had trouble hearing information and he gets confused during conversation. He states he feels good and has no trouble getting around. He moves himself well in the bed and transitioned to the EOB with supervision. He stood with Supervision and ambulated in the room for 65' without an assistive device with SBA.   He tried the bedside chair but did find it comfortable and elected tot return to his bed. He was kind and cooperative with therapy today but PT is uncertain how well he functions at home with his wife and her medical issues? Mr. Alicia Quinones would benefit from continued skilled PT while in the hospital for increased activity. SUBJECTIVE:   Mr. Alicia Quinones states, \"I am doing just fine and don't really know why I need to be here\"    SOCIAL HISTORY/LIVING ENVIRONMENT:   Home Environment: Private residence  # Steps to Enter: 2  One/Two Story Residence: One story  Living Alone: No  Support Systems: Spouse/Significant Other  OBJECTIVE:     PAIN: VITAL SIGNS: LINES/DRAINS:   Pre Treatment: Pain Screen  Pain Scale 1: Numeric (0 - 10)  Pain Intensity 1: 0  Post Treatment: 0/10     Visit Vitals  BP (!) 174/84 (BP 1 Location: Right arm, BP Patient Position: Supine)   Pulse 68   Temp 97.5 °F (36.4 °C)   Resp 20   Ht 6' (1.829 m)   Wt 59.6 kg (131 lb 6.4 oz)   SpO2 96%   BMI 17.82 kg/m²      O2 Device: None (Room air)     GROSS EVALUATION:   Within Functional Limits Abnormal/ Functional Abnormal/ Non-Functional (see comments) Not Tested Comments:   AROM [x] [] [] []    PROM [x] [] [] []    Strength [x] [] [] []    Balance [x] [] [] []    Posture [x] [] [] [] Slightly stooped standing posture   Sensation [x] [] [] []    Coordination [x] [] [] []    Tone [x] [] [] []    Edema [x] [] [] []    Activity Tolerance [x] [] [] []     [] [] [] []      COGNITION/  PERCEPTION: Intact Impaired   (see comments) Comments:   Orientation [x] [] To name and place but gets confused in conversation at times.    Vision [x] []    Hearing [x] []    Command Following [x] []    Safety Awareness [x] []     [] []      MOBILITY: I Mod I S SBA CGA Min Mod Max Total  NT x2 Comments:   Bed Mobility    Rolling [x] [] [] [] [] [] [] [] [] [] []    Supine to Sit [x] [] [] [] [] [] [] [] [] [] []    Scooting [x] [] [] [] [] [] [] [] [] [] []    Sit to Supine [x] [] [] [] [] [] [] [] [] [] []    Transfers    Sit to Stand [] [] [x] [] [] [] [] [] [] [] []    Bed to Chair [] [] [x] [] [] [] [] [] [] [] []    Stand to Sit [] [] [x] [] [] [] [] [] [] [] []    I=Independent, Mod I=Modified Independent, S=Supervision, SBA=Standby Assistance, CGA=Contact Guard Assistance,   Min=Minimal Assistance, Mod=Moderate Assistance, Max=Maximal Assistance, Total=Total Assistance, NT=Not Tested  GAIT: I Mod I S SBA CGA Min Mod Max Total  NT x2 Comments:   Level of Assistance [] [] [] [x] [] [] [] [] [] [] []    Distance 72' in room     DME None    Gait Quality Short steps, steady gait, stooped posture    Weightbearing Status N/A     I=Independent, Mod I=Modified Independent, S=Supervision, SBA=Standby Assistance, CGA=Contact Guard Assistance,   Min=Minimal Assistance, Mod=Moderate Assistance, Max=Maximal Assistance, Total=Total Assistance, NT=Not Tested    [address] [place] [address] [facility]       How much difficulty does the patient currently have. .. Unable A Lot A Little None   1. Turning over in bed (including adjusting bedclothes, sheets and blankets)? [] 1   [] 2   [] 3   [x] 4   2. Sitting down on and standing up from a chair with arms ( e.g., wheelchair, bedside commode, etc.)   [] 1   [] 2   [] 3   [x] 4   3. Moving from lying on back to sitting on the side of the bed? [] 1   [] 2   [] 3   [x] 4   How much help from another person does the patient currently need. .. Total A Lot A Little None   4. Moving to and from a bed to a chair (including a wheelchair)? [] 1   [] 2   [x] 3   [] 4   5. Need to walk in hospital room? [] 1   [] 2   [x] 3   [] 4   6. Climbing 3-5 steps with a railing? [] 1   [] 2   [x] 3   [] 4   © 2007, Trustees of [address] [place] [id number], under license to [employer].  All rights reserved     Score:  Initial: 21 Most Recent: X (Date: -- )    Interpretation of Tool:  Represents activities that are increasingly more difficult (i.e. Bed mobility, Transfers, Gait). PLAN:   FREQUENCY/DURATION: PT Plan of Care: 2 times/week for duration of hospital stay or until stated goals are met, whichever comes first.    PROBLEM LIST:   (Skilled intervention is medically necessary to address:)  1. Decreased ADL/Functional Activities  2. Decreased Activity Tolerance  3. Decreased Gait Ability  4. Decreased Strength   INTERVENTIONS PLANNED:   (Benefits and precautions of physical therapy have been discussed with the patient.)  1. Therapeutic Activity  2. Therapeutic Exercise/HEP  3. Neuromuscular Re-education  4. Gait Training  5. Education     TREATMENT:     EVALUATION: Low Complexity : (Untimed Charge)    TREATMENT:   ($$ Gait Trainin-22 mins    )  Gait Training (23 Minutes): Gait training for 65 feet utilizing None. Patient required Manual and Verbal cueing to improve Gait Mechanics.      AFTER TREATMENT POSITION/PRECAUTIONS:  Bed, Needs within reach and RN notified - tried sitting in the bedside chair and he did not like it as he is tall and his legs don't fit too well     INTERDISCIPLINARY COLLABORATION:  RN/PCT    TOTAL TREATMENT DURATION:  PT Patient Time In/Time Out  Time In: 1120  Time Out: Tracee Gordon, PT

## 2022-04-05 NOTE — PROGRESS NOTES
TRANSFER - IN REPORT:    Verbal report received from Josias Fam RN on Nithin Mcguire  being received from ED for routine progression of care      Report consisted of patients Situation, Background, Assessment and   Recommendations(SBAR). Information from the following report(s) SBAR was reviewed with the receiving nurse. Opportunity for questions and clarification was provided. Assessment completed upon patients arrival to unit and care assumed.

## 2022-04-05 NOTE — PROGRESS NOTES
END OF SHIFT NOTE:    Intake/Output  04/04 1901 - 04/05 0700  In: 360 [P.O.:360]  Out: 750 [Urine:750]   Voiding: YES  Catheter: NO  Drain:              Stool:  0occurrences. Emesis:  0 occurrences. VITAL SIGNS  Patient Vitals for the past 12 hrs:   Temp Pulse Resp BP SpO2   04/05/22 0317 97.5 °F (36.4 °C) 75 16 134/77 99 %   04/04/22 2316 97.7 °F (36.5 °C) 66 16 136/65 99 %   04/04/22 2104 97.8 °F (36.6 °C) 72 16 (!) 147/94 100 %   04/04/22 2015    (!) 154/78        Pain Assessment  Pain 1  Pain Scale 1: Numeric (0 - 10) (04/05/22 0410)  Pain Intensity 1: 0 (04/05/22 0410)  Patient Stated Pain Goal: 0 (04/05/22 0410)    Ambulating  YES    Additional Information: Patient resting quietly and patients vitals are stable. Shift report given to oncoming nurse at the bedside.     Tom Schulte RN

## 2022-04-05 NOTE — PROGRESS NOTES
Received pt from [facility]) in stable condition. Pt in bed resting quietly. Resp even & unlabored on room air; no acute signs of distress noted. Bed low & locked; call light in reach; no needs voiced.

## 2022-04-05 NOTE — PROGRESS NOTES
Hospitalist Progress Note   Admit Date:  2022  4:01 PM   Name:  Nithin Mcguire   Age:  80 y.o. Sex:  male  :  1940   MRN:  [id number]   Room:  [facility]/    Presenting Complaint: Hypotension    Reason(s) for Admission: Orthostatic hypotension [I95.1]     Hospital Course & Interval History:     Nithin Mcguire is a 80 y.o. male with medical history of orthostatic intolerance, bradycardia, hypertension, hyperlipidemia, Ménière's disease who presented with episodes of hypotension. Patient follows up with cardiology outpatient. Back in 2021 patient had documentation of low blood pressures with dizziness. Patient admitted to low blood pressure and dizziness today. Patient has been advised to stop taking his lisinopril since 3/20/2022 but has continued to have persistent symptoms. Patient's symptoms exacerbated when standing up to walk anywhere. Patient denies vertigo, fainting, disequilibrium. Patient denied any cardiac chest pain, shortness of breath, abdominal pain, fever/chills. Blood pressure on arrival of 185/77. Orthostatics positive with blood pressure dropping to systolic 46L. CT head showed chronic small vessel disease. X-ray of abdomen pelvis showed nondistended bowel gas pattern. EKG normal sinus rhythm. Subjective/24hr Events (22): Patient examined at bedside. No acute overnight events. Vertigo symptoms have improved. Denies fevers/chills, chest pain, shortness of breath. Denies abdominal pain, nausea/vomiting or diarrhea. ROS:  10 systems reviewed and negative except as noted above.      Assessment & Plan:     Principal Problem:    Orthostatic hypotension (2022)  - NO INCREASE in HR means it is likely neurologic (most likely autonomic) or cardiac in etiology   - chronic and has been evaluated as an outpatient  - does NOT have compensatory increase in HRs during orthostatic vitals  - has supine hypertension   - stop finasteride (implemented in [facility] [place])  - there's some studies that show that short-acting nitrates at nighttime might help with supine hypertension without causing rebound [place] ([email or web address])  - added Isordil 5 mg po qHS and monitor response  - agree with compression stockings  - monitor serial BPs  - hold lisinopril as it is longer activing  - hold home Flomax  - TTE pending  - carotid US unrevealing for acute etiology     Active Problems:    Mixed hyperlipidemia (5/19/2016)  - statin      Essential hypertension (5/19/2016)   - as above    F/E/N: no fluids, replete electrolytes as needed, regular diet    Ppx: Lovenox for VTE    Code Status: FULL CODE    Disposition: pending clinical improvement with plan as above. PT/OT consults and PPD ordered. Hopefully discharge in the next 1-2 days. Discussed with patient at bedside. All questions answered.      Hospital Problems as of 4/5/2022 Date Reviewed: 11/3/2021          Codes Class Noted - Resolved POA    CAD (coronary artery disease) ICD-10-CM: I25.10  ICD-9-CM: 414.00  4/5/2022 - Present Unknown        * (Principal) Orthostatic hypotension ICD-10-CM: I95.1  ICD-9-CM: 458.0  4/4/2022 - Present Unknown        Bradycardia ICD-10-CM: R00.1  ICD-9-CM: 427.89  4/4/2022 - Present Unknown        Insomnia ICD-10-CM: G47.00  ICD-9-CM: 780.52  4/4/2022 - Present Unknown        Mixed hyperlipidemia ICD-10-CM: E78.2  ICD-9-CM: 272.2  5/19/2016 - Present Yes        Essential hypertension ICD-10-CM: I10  ICD-9-CM: 401.9  5/19/2016 - Present Yes              Objective:     Patient Vitals for the past 24 hrs:   Temp Pulse Resp BP SpO2   04/05/22 1157 97.7 °F (36.5 °C) 65 20 (!) 163/93 98 %   04/05/22 0838 97.5 °F (36.4 °C) 68 20 (!) 174/84 96 %   04/05/22 0317 97.5 °F (36.4 °C) 75 16 134/77 99 %   04/04/22 2316 97.7 °F (36.5 °C) 66 16 136/65 99 %   04/04/22 2104 97.8 °F (36.6 °C) 72 16 (!) 147/94 100 %   04/04/22 2015    (!) 154/78    04/04/22 1828  85  95/64    04/04/22 1827  79  (!) 175/85    04/04/22 1825  67  (!) 187/86    04/04/22 1700  (!) 54 22 (!) 185/77      Oxygen Therapy  O2 Sat (%): 98 % (04/05/22 1157)  O2 Device: None (Room air) (04/05/22 0410)    Estimated body mass index is 17.82 kg/m² as calculated from the following:    Height as of this encounter: 6' (1.829 m). Weight as of this encounter: 59.6 kg (131 lb 6.4 oz). Intake/Output Summary (Last 24 hours) at 4/5/2022 1512  Last data filed at 4/5/2022 1157  Gross per 24 hour   Intake 1580 ml   Output 1300 ml   Net 280 ml         Physical Exam:     Blood pressure (!) 163/93, pulse 65, temperature 97.7 °F (36.5 °C), resp. rate 20, height 6' (1.829 m), weight 59.6 kg (131 lb 6.4 oz), SpO2 98 %. General:    Well nourished. No overt distress  Head:  Normocephalic, atraumatic  Eyes:  Sclerae appear normal.  Pupils equally round. ENT:  Nares appear normal, no drainage. Moist oral mucosa  Neck:  No restricted ROM. Trachea midline   CV:   RRR. No jugular venous distension. Lungs:   CTAB. No wheezing, rhonchi, or rales. Respirations even, unlabored  Abdomen: Bowel sounds present. Soft, nontender, nondistended. Extremities: No cyanosis or clubbing. No edema  Skin:     No rashes and normal coloration. Warm and dry. Neuro:  CN II-XII grossly intact. Sensation intact. A&Ox3  Psych:  Normal mood and affect.       I have reviewed ordered lab tests and independently visualized imaging below:    Recent Labs:  Recent Results (from the past 48 hour(s))   HEMOGLOBIN A1C WITH EAG    Collection Time: 04/04/22  2:29 PM   Result Value Ref Range    Hemoglobin A1c 5.5 4.20 - 6.30 %    Est. average glucose 111 mg/dL   CBC WITH AUTOMATED DIFF    Collection Time: 04/04/22  2:30 PM   Result Value Ref Range    WBC 8.1 4.3 - 11.1 K/uL    RBC 3.70 (L) 4.23 - 5.6 M/uL    HGB 12.6 (L) 13.6 - 17.2 g/dL    HCT 38.4 (L) 41.1 - 50.3 %    .8 (H) 79.6 - 97.8 FL    MCH 34.1 (H) 26.1 - 32.9 PG    MCHC 32.8 31.4 - 35.0 g/dL    RDW 13.1 11.9 - 14.6 %    PLATELET 920 576 - 663 K/uL    MPV 9.3 (L) 9.4 - 12.3 FL    ABSOLUTE NRBC 0.00 0.0 - 0.2 K/uL    DF AUTOMATED      NEUTROPHILS 76 43 - 78 %    LYMPHOCYTES 15 13 - 44 %    MONOCYTES 7 4.0 - 12.0 %    EOSINOPHILS 1 0.5 - 7.8 %    BASOPHILS 1 0.0 - 2.0 %    IMMATURE GRANULOCYTES 0 0.0 - 5.0 %    ABS. NEUTROPHILS 6.2 1.7 - 8.2 K/UL    ABS. LYMPHOCYTES 1.2 0.5 - 4.6 K/UL    ABS. MONOCYTES 0.6 0.1 - 1.3 K/UL    ABS. EOSINOPHILS 0.1 0.0 - 0.8 K/UL    ABS. BASOPHILS 0.0 0.0 - 0.2 K/UL    ABS. IMM. GRANS. 0.0 0.0 - 0.5 K/UL   METABOLIC PANEL, COMPREHENSIVE    Collection Time: 04/04/22  2:30 PM   Result Value Ref Range    Sodium 140 138 - 145 mmol/L    Potassium 4.1 3.5 - 5.1 mmol/L    Chloride 106 98 - 107 mmol/L    CO2 30 21 - 32 mmol/L    Anion gap 4 (L) 7 - 16 mmol/L    Glucose 96 65 - 100 mg/dL    BUN 16 8 - 23 MG/DL    Creatinine 1.00 0.8 - 1.5 MG/DL    GFR est AA >60 >60 ml/min/1.73m2    GFR est non-AA >60 >60 ml/min/1.73m2    Calcium 8.7 8.3 - 10.4 MG/DL    Bilirubin, total 0.2 0.2 - 1.1 MG/DL    ALT (SGPT) 20 12 - 65 U/L    AST (SGOT) 20 15 - 37 U/L    Alk.  phosphatase 76 50 - 136 U/L    Protein, total 6.5 6.3 - 8.2 g/dL    Albumin 3.0 (L) 3.2 - 4.6 g/dL    Globulin 3.5 2.3 - 3.5 g/dL    A-G Ratio 0.9 (L) 1.2 - 3.5     MAGNESIUM    Collection Time: 04/04/22  2:30 PM   Result Value Ref Range    Magnesium 2.4 1.8 - 2.4 mg/dL   EKG, 12 LEAD, INITIAL    Collection Time: 04/04/22  4:41 PM   Result Value Ref Range    Ventricular Rate 61 BPM    Atrial Rate 61 BPM    P-R Interval 162 ms    QRS Duration 89 ms    Q-T Interval 429 ms    QTC Calculation (Bezet) 433 ms    Calculated P Axis 87 degrees    Calculated R Axis 79 degrees    Calculated T Axis 35 degrees    Diagnosis       Sinus rhythm  Probable left ventricular hypertrophy    Confirmed by ST YUE NORTON MD (), EJ HUA ([id number]) on 4/4/2022 6:40:51 PM     TROPONIN-HIGH SENSITIVITY    Collection Time: 04/04/22  5:02 PM   Result Value Ref Range Troponin-High Sensitivity 7.3 0 - 14 pg/mL   TSH 3RD GENERATION    Collection Time: 04/04/22  5:02 PM   Result Value Ref Range    TSH 0.878 0.358 - 3.740 uIU/mL   T4, FREE    Collection Time: 04/04/22  5:02 PM   Result Value Ref Range    T4, Free 1.0 0.78 - 1.46 NG/DL   POC URINE MACROSCOPIC    Collection Time: 04/04/22  5:28 PM   Result Value Ref Range    Spec. gravity (POC) 1.025 (H) 1.001 - 1.023      pH, urine  (POC) 6.5 5.0 - 9.0      Protein (POC) Negative NEG mg/dL    Glucose, urine (POC) Negative NEG mg/dL    Ketones (POC) Negative NEG mg/dL    Bilirubin (POC) Negative NEG      Blood (POC) Negative NEG      Urobilinogen (POC) 0.2 0.2 - 1.0 EU/dL    Nitrite (POC) Negative NEG      Leukocyte esterase (POC) Negative NEG      Performed by [facility]    METABOLIC PANEL, COMPREHENSIVE    Collection Time: 04/05/22  7:04 AM   Result Value Ref Range    Sodium 140 138 - 145 mmol/L    Potassium 3.6 3.5 - 5.1 mmol/L    Chloride 105 98 - 107 mmol/L    CO2 33 (H) 21 - 32 mmol/L    Anion gap 2 (L) 7 - 16 mmol/L    Glucose 82 65 - 100 mg/dL    BUN 12 8 - 23 MG/DL    Creatinine 0.70 (L) 0.8 - 1.5 MG/DL    GFR est AA >60 >60 ml/min/1.73m2    GFR est non-AA >60 >60 ml/min/1.73m2    Calcium 8.7 8.3 - 10.4 MG/DL    Bilirubin, total 0.5 0.2 - 1.1 MG/DL    ALT (SGPT) 16 12 - 65 U/L    AST (SGOT) 22 15 - 37 U/L    Alk.  phosphatase 70 50 - 136 U/L    Protein, total 6.6 6.3 - 8.2 g/dL    Albumin 3.2 3.2 - 4.6 g/dL    Globulin 3.4 2.3 - 3.5 g/dL    A-G Ratio 0.9 (L) 1.2 - 3.5     MAGNESIUM    Collection Time: 04/05/22  7:04 AM   Result Value Ref Range    Magnesium 2.3 1.8 - 2.4 mg/dL   CBC WITH AUTOMATED DIFF    Collection Time: 04/05/22  7:04 AM   Result Value Ref Range    WBC 6.7 4.3 - 11.1 K/uL    RBC 3.81 (L) 4.23 - 5.6 M/uL    HGB 12.8 (L) 13.6 - 17.2 g/dL    HCT 38.6 (L) 41.1 - 50.3 %    .3 (H) 79.6 - 97.8 FL    MCH 33.6 (H) 26.1 - 32.9 PG    MCHC 33.2 31.4 - 35.0 g/dL    RDW 13.1 11.9 - 14.6 %    PLATELET 469 950 - 450 K/uL    MPV 10.0 9.4 - 12.3 FL    ABSOLUTE NRBC 0.00 0.0 - 0.2 K/uL    DF AUTOMATED      NEUTROPHILS 69 43 - 78 %    LYMPHOCYTES 22 13 - 44 %    MONOCYTES 7 4.0 - 12.0 %    EOSINOPHILS 2 0.5 - 7.8 %    BASOPHILS 1 0.0 - 2.0 %    IMMATURE GRANULOCYTES 0 0.0 - 5.0 %    ABS. NEUTROPHILS 4.6 1.7 - 8.2 K/UL    ABS. LYMPHOCYTES 1.5 0.5 - 4.6 K/UL    ABS. MONOCYTES 0.5 0.1 - 1.3 K/UL    ABS. EOSINOPHILS 0.1 0.0 - 0.8 K/UL    ABS. BASOPHILS 0.0 0.0 - 0.2 K/UL    ABS. IMM.  GRANS. 0.0 0.0 - 0.5 K/UL   PROTHROMBIN TIME + INR    Collection Time: 04/05/22  7:04 AM   Result Value Ref Range    Prothrombin time 12.7 12.6 - 14.5 sec    INR 0.9     PTT    Collection Time: 04/05/22  7:04 AM   Result Value Ref Range    aPTT 33.5 24.1 - 35.1 SEC   ECHO ADULT COMPLETE    Collection Time: 04/05/22  8:22 AM   Result Value Ref Range    LV EDV A2C 95 mL    LV EDV A4C 87 mL    LV ESV A2C 48 mL    LV ESV A4C 50 mL    IVSd 0.9 0.6 - 1.0 cm    LVIDd 4.8 4.2 - 5.9 cm    LVIDs 3.5 cm    LVOT Diameter 2.1 cm    LVOT Mean Gradient 2 mmHg    LVOT VTI 22.0 cm    LVOT Peak Velocity 1.0 m/s    LVOT Peak Gradient 4 mmHg    LVPWd 1.0 0.6 - 1.0 cm    LV E' Lateral Velocity 9 cm/s    LV E' Septal Velocity 8 cm/s    LV Ejection Fraction A2C 50 %    LV Ejection Fraction A4C 42 %    EF BP 47 (A) 55 - 100 %    LVOT Area 3.5 cm2    LVOT SV 76.2 ml    LA Minor Axis 4.4 cm    LA Major Axis 4.6 cm    LA Area 2C 16.6 cm2    LA Area 4C 15.9 cm2    LA Volume BP 46 18 - 58 mL    LA Diameter 3.1 cm    AV Mean Velocity 0.8 m/s    AV Mean Gradient 3 mmHg    AV VTI 28.3 cm    AV Peak Velocity 1.4 m/s    AV Peak Gradient 7 mmHg    AV Area by VTI 2.7 cm2    AV Area by Peak Velocity 2.6 cm2    Aortic Root 3.7 cm    Ascending Aorta 3.4 cm    IVC Proxmal 1.5 cm    MR .0 cm    MV Mean Gradient 2 mmHg    MV VTI 23.4 cm    MV Mean Velocity 0.6 m/s    MR Peak Velocity 6.7 m/s    MR Peak Gradient 180 mmHg    MV Max Velocity 1.0 m/s    MV Peak Gradient 4 mmHg    MV E Wave Deceleration Time 298.0 ms    MV A Velocity 0.76 m/s    MV E Velocity 0.52 m/s    MV Area by VTI 3.3 cm2    PV .0 ms    PV Max Velocity 0.9 m/s    PV Peak Gradient 3 mmHg    Est. RA Pressure 3 mmHg    RVIDd 3.8 cm    RV Basal Dimension 3.2 cm    TAPSE 1.8 1.5 - 2.0 cm    TR Max Velocity 2.66 m/s    TR Peak Gradient 28 mmHg    Fractional Shortening 2D 27 28 - 44 %    LV ESV Index A4C 28 mL/m2    LV EDV Index A4C 49 mL/m2    LV ESV Index A2C 27 mL/m2    LV EDV Index A2C 53 mL/m2    LVIDd Index 2.70 cm/m2    LVIDs Index 1.97 cm/m2    LV RWT Ratio 0.42     LV Mass 2D 158.8 88 - 224 g    LV Mass 2D Index 89.2 49 - 115 g/m2    MV E/A 0.68     E/E' Ratio (Averaged) 6.14     E/E' Lateral 5.78     E/E' Septal 6.50     LA Volume Index BP 26 16 - 34 ml/m2    LVOT Stroke Volume Index 42.8 mL/m2    LA Size Index 1.74 cm/m2    LA/AO Root Ratio 0.84     Ao Root Index 2.08 cm/m2    Ascending Aorta Index 1.91 cm/m2    AV Velocity Ratio 0.71     LVOT:AV VTI Index 0.78     YAYO/BSA VTI 1.5 cm2/m2    YAYO/BSA Peak Velocity 1.5 cm2/m2    MV:LVOT VTI Index 1.06     RVSP 31 mmHg       All Micro Results     None          Other Studies:  XR ABD (AP AND ERECT OR DECUB)    Result Date: 4/4/2022  Abdomen, AP and upright INDICATION: Left lower quadrant pain FINDINGS: Bowel gas pattern is nondistended. Scattered air and stool in the colon. No free air. Lung bases are clear. Vascular calcifications. 1. Nondistended bowel gas pattern    CT HEAD WO CONT    Result Date: 4/4/2022  HEAD CT WITHOUT CONTRAST  4/4/2022 HISTORY:   weakness  Pt to triage with mask in place. Pt reports low blood pressure and dizziness. Pt report stopping lisinopril on 3/20 but has had persistent sx. Pt reports being orthostatic. Pt also reports some shob. TECHNIQUE: Noncontrast axial images were obtained through the brain.   All CT scans at this facility used dose modulation, interactive reconstruction and/or weight based dosing when appropriate to reduce radiation dose to as low as reasonably achievable. COMPARISON: None FINDINGS: There is no acute intracranial hemorrhage, significant mass effect or CT evidence of acute large artery territorial infarction. Please note that a hyperacute infarct or small vessel infarct may not be apparent on initial CT imaging. Moderate cerebral volume loss is present without disproportionate hippocampal atrophy. Areas of decreased attenuation throughout the white matter suggests changes of chronic small vessel ischemic disease. There is no hydrocephalus , intra-axial mass or abnormal extra-axial fluid collection. There are no displaced skull fractures. The mastoid air cells and paranasal sinuses are clear where imaged. Moderate cerebral volume loss and white matter findings compatible with chronic small vessel ischemic disease. DUPLEX CAROTID BILATERAL    Result Date: 4/5/2022  CAROTID ULTRASOUND HISTORY: Dizziness COMPARISON: None. TECHNIQUE: High resolution imaging of the carotid and vertebral arteries was performed bilaterally with gray scale and color Doppler imaging, and Doppler spectral analysis. FINDINGS: *  RIGHT CAROTID: No significant plaque formation or stenosis is seen. Carotid waveforms appeared normal. *  LEFT CAROTID: No significant plaque formation or stenosis is seen. Carotid waveforms appeared normal. *  VERTEBRAL ARTERIES: Vertebral artery flow is antegrade bilaterally. VELOCITIES FOLLOW BELOW: . .......................... RIGHT. ................ Thurl Mutton LEFT ICA systolic. ...... . 83 .......... 98 CCA systolic. ....... 41 .......... 74 ICA/CCA ratio. ...... 2.1 ..................1.3 ICA diastolic. ..... Thurl Mutton 13 cm/sec. ........ Thurl Mutton 18 cm/sec     No evidence of hemodynamically significant stenosis. PQRI: Indirect Method was used. The velocity criteria are extrapolated from diameter data as defined by the Society of Radiologists in [address] [place] [address] Radiology 2003; 209;711-876.  Date Of Dictation: 4/5/2022 11:09 AM      Current Meds:  Current Facility-Administered Medications   Medication Dose Route Frequency    isosorbide dinitrate (ISORDIL) tablet 5 mg  5 mg Oral QHS    aspirin delayed-release tablet 81 mg  81 mg Oral DAILY    cholecalciferol (VITAMIN D3) (5000 Units/125 mcg) tablet 5,000 Units  5,000 Units Oral DAILY    finasteride (PROSCAR) tablet 5 mg  5 mg Oral DAILY    fluticasone propionate (FLONASE) 50 mcg/actuation nasal spray 2 Spray  2 Spray Both Nostrils DAILY    pravastatin (PRAVACHOL) tablet 20 mg  20 mg Oral QHS    zolpidem (AMBIEN) tablet 5 mg  5 mg Oral QHS PRN    sodium chloride (NS) flush 5-40 mL  5-40 mL IntraVENous Q8H    sodium chloride (NS) flush 5-40 mL  5-40 mL IntraVENous PRN    acetaminophen (TYLENOL) tablet 650 mg  650 mg Oral Q6H PRN    Or    acetaminophen (TYLENOL) suppository 650 mg  650 mg Rectal Q6H PRN    polyethylene glycol (MIRALAX) packet 17 g  17 g Oral DAILY PRN    ondansetron (ZOFRAN ODT) tablet 4 mg  4 mg Oral Q8H PRN    Or    ondansetron (ZOFRAN) injection 4 mg  4 mg IntraVENous Q6H PRN    enoxaparin (LOVENOX) injection 40 mg  40 mg SubCUTAneous DAILY       Signed: Bertrand Kim DO    Part of this note may have been written by using a voice dictation software. The note has been proof read but may still contain some grammatical/other typographical errors.

## 2022-04-05 NOTE — PROGRESS NOTES
Physician Progress Note      PATIENTManelida Schmid  CSN #:                  [id number]  :                       1940  ADMIT DATE:       2022 4:01 PM  [address] [place] DATE:  RESPONDING  PROVIDER #:        LASHANDA LAGUNA DO          QUERY TEXT:    Patient admitted with orthostatic hypotension. If possible, please document in progress notes and discharge summary if you are evaluating and /or treating : The medical record reflects the following:  Risk Factors: 80 yr, hx bradycardia, hypertension, hyperlipidemia, Meniere's disease    Clinical Indicators: Review of the documentation for this patient reveals the clinical indicators of a BMI of 17.82 and a weight of  131 lbs. per documentation in medical record on physical exam thin. frail,    Treatment: adult oral nutrition supplement with breakfast, lunch, dinner  Options provided:  -- Underweight with BMI 17.82  -- Other - I will add my own diagnosis  -- Disagree - Not applicable / Not valid  -- Disagree - Clinically unable to determine / Unknown  -- Refer to Clinical Documentation Reviewer    PROVIDER RESPONSE TEXT:    This patient is underweight with a BMI of 17.82.     Query created by: Leticia Erickson on 2022 11:42 AM      Electronically signed by:  Jorge Mohr DO 2022 11:48 AM

## 2022-04-05 NOTE — H&P
Hospitalist History and Physical   Admit Date:  2022  4:01 PM   Name:  Lee Orellana   Age:  80 y.o. Sex:  male  :  1940   MRN:  [id number]   Room:  [facility]/    Presenting Complaint: Hypotension    Reason(s) for Admission: Orthostatic hypotension [I95.1]     History of Present Illness:   Lee Orellana is a 80 y.o. male with medical history of orthostatic intolerance, bradycardia, hypertension, hyperlipidemia, Ménière's disease who presented with episodes of hypotension. Patient follows up with cardiology outpatient. Back in 2021 patient had documentation of low blood pressures with dizziness. Patient admitted to low blood pressure and dizziness today. Patient has been advised to stop taking his lisinopril since 3/20/2022 but has continued to have persistent symptoms. Patient's symptoms exacerbated when standing up to walk anywhere. Patient denies vertigo, fainting, disequilibrium. Patient denied any cardiac chest pain, shortness of breath, abdominal pain, fever/chills. Blood pressure on arrival of 185/77. Orthostatics positive with blood pressure dropping to systolic 16K. CT head showed chronic small vessel disease. X-ray of abdomen pelvis showed nondistended bowel gas pattern. EKG normal sinus rhythm. Review of Systems:  10 systems reviewed and negative except as noted in HPI.   Assessment & Plan:   Orthostatic hypotension (2022)  Documentation from cardiologist Dr. Lita Senior about orthostatic intolerance  Possible automatic insufficiency with prior longstanding issues with neuropathy  Can consider neuro consult  Positive orthostatics with blood pressure dropping from 909 systolic to 02I upon standing  Daughter at bedside stated been eating and drinking fine  Echo pending  Carotid Doppler pending  TSH/T4  A1c pending  Cardiology consulted, appreciate recommendations  CT head with chronic small vessel disease  EKG normal sinus rhythm    Bradycardia  Asymptomatic    Hypertension  Holding all blood pressure meds in the setting of orthostatic hypotension  Cardiology consulted, appreciate recommendations    Hyperlipidemia  Continue home statin    Insomnia  Ambien nightly    Dispo/Discharge Planning:   Dispo pending clinical course    Diet: No diet orders on file  VTE ppx: Lovenox  Code status: No Order    Hospital Problems as of 2022 Date Reviewed: 11/3/2021          Codes Class Noted - Resolved POA    Orthostatic hypotension ICD-10-CM: I95.1  ICD-9-CM: 458.0  2022 - Present Unknown              Past History:  Past Medical History:   Diagnosis Date    Bradycardia     Chest pain, unspecified 2016    Coronary disease     Essential hypertension 2016    Lower extremity neuropathy     Mixed hyperlipidemia 2016    Myalgia      Past Surgical History:   Procedure Laterality Date    HX HEART CATHETERIZATION        No Known Allergies   Social History     Tobacco Use    Smoking status: Former Smoker     Packs/day: 1.50     Years: 50.00     Pack years: 75.00     Quit date: 2012     Years since quittin.8    Smokeless tobacco: Never Used    Tobacco comment: smoked for 40 to 50 years, previously smoked 1 1/2 PPD, stopped 3 years ago   Substance Use Topics    Alcohol use: No     Alcohol/week: 0.0 standard drinks      Family History   Problem Relation Age of Onset    Heart Attack Father 47      Family history reviewed and negative except as noted above. There is no immunization history on file for this patient.   Prior to Admit Medications:  Current Outpatient Medications   Medication Instructions    aspirin delayed-release 81 mg tablet Oral, DAILY    cholecalciferol, VITAMIN D3, (VITAMIN D3) 5,000 unit tab tablet Oral, DAILY    co-enzyme Q-10 (CO Q-10) 100 mg, Oral, DAILY    ezetimibe (ZETIA) 10 mg, Oral, DAILY    finasteride (PROSCAR) 5 mg, Oral, DAILY    fluticasone propionate (Flonase Allergy Relief) 50 mcg/actuation nasal spray 2 Sprays, Both Nostrils, DAILY    lisinopriL (PRINIVIL, ZESTRIL) 20 mg, Oral, DAILY    meclizine (ANTIVERT) 12.5 mg tablet Oral, 3 TIMES DAILY AS NEEDED    multivitamin (ONE A DAY) tablet 1 Tablet, Oral, DAILY    nitroglycerin (NITROSTAT) 0.4 mg SL tablet SubLINGual, EVERY 5 MIN AS NEEDED    pravastatin (PRAVACHOL) 20 mg, Oral, EVERY BEDTIME    tamsulosin (FLOMAX) 0.4 mg, Oral, DAILY    zolpidem (AMBIEN) 5 mg tablet Oral, BEDTIME PRN       Objective:     Patient Vitals for the past 24 hrs:   Temp Pulse Resp BP SpO2   04/04/22 2015    (!) 154/78    04/04/22 1828  85  95/64    04/04/22 1827  79  (!) 175/85    04/04/22 1825  67  (!) 187/86    04/04/22 1700  (!) 54 22 (!) 185/77    04/04/22 1422 98.2 °F (36.8 °C) 84 20 94/78 100 %     Oxygen Therapy  O2 Sat (%): 100 % (04/04/22 1422)  O2 Device: None (Room air) (04/04/22 1711)    Estimated body mass index is 20.61 kg/m² as calculated from the following:    Height as of this encounter: 6' (1.829 m). Weight as of this encounter: 68.9 kg (152 lb). Intake/Output Summary (Last 24 hours) at 4/4/2022 2050  Last data filed at 4/4/2022 1742  Gross per 24 hour   Intake 1000 ml   Output    Net 1000 ml         Physical Exam:  Blood pressure (!) 154/78, pulse 85, temperature 98.2 °F (36.8 °C), resp. rate 22, height 6' (1.829 m), weight 68.9 kg (152 lb), SpO2 100 %. General:    Well nourished. No overt distress  Head:  Normocephalic, atraumatic  Eyes:  Sclerae appear normal.  Pupils equally round. ENT:  Nares appear normal, no drainage. Moist oral mucosa  Neck:  No restricted ROM. Trachea midline   CV:   RRR. No m/r/g. No jugular venous distension. Lungs:   CTAB. No wheezing, rhonchi, or rales. Respirations even, unlabored  Abdomen: Bowel sounds present. Soft, nontender, nondistended. Extremities: No cyanosis or clubbing. No edema  Skin:     No rashes and normal coloration. Warm and dry.     Neuro:  CN II-XII grossly intact. Sensation intact. A&Ox3  Psych:  Normal mood and affect. I have reviewed ordered lab tests and independently visualized imaging below:    Last 24hr Labs:  Recent Results (from the past 24 hour(s))   CBC WITH AUTOMATED DIFF    Collection Time: 04/04/22  2:30 PM   Result Value Ref Range    WBC 8.1 4.3 - 11.1 K/uL    RBC 3.70 (L) 4.23 - 5.6 M/uL    HGB 12.6 (L) 13.6 - 17.2 g/dL    HCT 38.4 (L) 41.1 - 50.3 %    .8 (H) 79.6 - 97.8 FL    MCH 34.1 (H) 26.1 - 32.9 PG    MCHC 32.8 31.4 - 35.0 g/dL    RDW 13.1 11.9 - 14.6 %    PLATELET 783 978 - 356 K/uL    MPV 9.3 (L) 9.4 - 12.3 FL    ABSOLUTE NRBC 0.00 0.0 - 0.2 K/uL    DF AUTOMATED      NEUTROPHILS 76 43 - 78 %    LYMPHOCYTES 15 13 - 44 %    MONOCYTES 7 4.0 - 12.0 %    EOSINOPHILS 1 0.5 - 7.8 %    BASOPHILS 1 0.0 - 2.0 %    IMMATURE GRANULOCYTES 0 0.0 - 5.0 %    ABS. NEUTROPHILS 6.2 1.7 - 8.2 K/UL    ABS. LYMPHOCYTES 1.2 0.5 - 4.6 K/UL    ABS. MONOCYTES 0.6 0.1 - 1.3 K/UL    ABS. EOSINOPHILS 0.1 0.0 - 0.8 K/UL    ABS. BASOPHILS 0.0 0.0 - 0.2 K/UL    ABS. IMM. GRANS. 0.0 0.0 - 0.5 K/UL   METABOLIC PANEL, COMPREHENSIVE    Collection Time: 04/04/22  2:30 PM   Result Value Ref Range    Sodium 140 138 - 145 mmol/L    Potassium 4.1 3.5 - 5.1 mmol/L    Chloride 106 98 - 107 mmol/L    CO2 30 21 - 32 mmol/L    Anion gap 4 (L) 7 - 16 mmol/L    Glucose 96 65 - 100 mg/dL    BUN 16 8 - 23 MG/DL    Creatinine 1.00 0.8 - 1.5 MG/DL    GFR est AA >60 >60 ml/min/1.73m2    GFR est non-AA >60 >60 ml/min/1.73m2    Calcium 8.7 8.3 - 10.4 MG/DL    Bilirubin, total 0.2 0.2 - 1.1 MG/DL    ALT (SGPT) 20 12 - 65 U/L    AST (SGOT) 20 15 - 37 U/L    Alk.  phosphatase 76 50 - 136 U/L    Protein, total 6.5 6.3 - 8.2 g/dL    Albumin 3.0 (L) 3.2 - 4.6 g/dL    Globulin 3.5 2.3 - 3.5 g/dL    A-G Ratio 0.9 (L) 1.2 - 3.5     MAGNESIUM    Collection Time: 04/04/22  2:30 PM   Result Value Ref Range    Magnesium 2.4 1.8 - 2.4 mg/dL   EKG, 12 LEAD, INITIAL    Collection Time: 04/04/22  4:41 PM   Result Value Ref Range    Ventricular Rate 61 BPM    Atrial Rate 61 BPM    P-R Interval 162 ms    QRS Duration 89 ms    Q-T Interval 429 ms    QTC Calculation (Bezet) 433 ms    Calculated P Axis 87 degrees    Calculated R Axis 79 degrees    Calculated T Axis 35 degrees    Diagnosis       Sinus rhythm  Probable left ventricular hypertrophy    Confirmed by ST YUE NORTON MD (), EJ MELITA ([id number]) on 4/4/2022 6:40:51 PM     TROPONIN-HIGH SENSITIVITY    Collection Time: 04/04/22  5:02 PM   Result Value Ref Range    Troponin-High Sensitivity 7.3 0 - 14 pg/mL   POC URINE MACROSCOPIC    Collection Time: 04/04/22  5:28 PM   Result Value Ref Range    Spec. gravity (POC) 1.025 (H) 1.001 - 1.023      pH, urine  (POC) 6.5 5.0 - 9.0      Protein (POC) Negative NEG mg/dL    Glucose, urine (POC) Negative NEG mg/dL    Ketones (POC) Negative NEG mg/dL    Bilirubin (POC) Negative NEG      Blood (POC) Negative NEG      Urobilinogen (POC) 0.2 0.2 - 1.0 EU/dL    Nitrite (POC) Negative NEG      Leukocyte esterase (POC) Negative NEG      Performed by Bettina Campbell        All Micro Results     None          Other Studies:  XR ABD (AP AND ERECT OR DECUB)    Result Date: 4/4/2022  Abdomen, AP and upright INDICATION: Left lower quadrant pain FINDINGS: Bowel gas pattern is nondistended. Scattered air and stool in the colon. No free air. Lung bases are clear. Vascular calcifications. 1. Nondistended bowel gas pattern    CT HEAD WO CONT    Result Date: 4/4/2022  HEAD CT WITHOUT CONTRAST  4/4/2022 HISTORY:   weakness  Pt to triage with mask in place. Pt reports low blood pressure and dizziness. Pt report stopping lisinopril on 3/20 but has had persistent sx. Pt reports being orthostatic. Pt also reports some shob. TECHNIQUE: Noncontrast axial images were obtained through the brain.   All CT scans at this facility used dose modulation, interactive reconstruction and/or weight based dosing when appropriate to reduce radiation dose to as low as reasonably achievable. COMPARISON: None FINDINGS: There is no acute intracranial hemorrhage, significant mass effect or CT evidence of acute large artery territorial infarction. Please note that a hyperacute infarct or small vessel infarct may not be apparent on initial CT imaging. Moderate cerebral volume loss is present without disproportionate hippocampal atrophy. Areas of decreased attenuation throughout the white matter suggests changes of chronic small vessel ischemic disease. There is no hydrocephalus , intra-axial mass or abnormal extra-axial fluid collection. There are no displaced skull fractures. The mastoid air cells and paranasal sinuses are clear where imaged. Moderate cerebral volume loss and white matter findings compatible with chronic small vessel ischemic disease. XR CHEST PORT    Result Date: 4/4/2022  Portable chest x-ray CLINICAL INDICATION: Shortness of breath FINDINGS: Single AP view of the chest submitted without comparison show the lungs to be hyperexpanded but otherwise clear. No pleural effusion or pneumothorax. The cardiac silhouette and mediastinum are unremarkable. Hyperexpanded lungs, otherwise no acute abnormality. Medications Administered     sodium chloride 0.9 % bolus infusion 1,000 mL     Admin Date  04/04/2022 Action  New Bag Dose  1,000 mL Rate  1,000 mL/hr Route  IntraVENous Administered By  Sidney Osborn RN                Signed:  Bakari Chavez MD    Part of this note may have been written by using a voice dictation software. The note has been proof read but may still contain some grammatical/other typographical errors.

## 2022-04-05 NOTE — PROGRESS NOTES
Problem: Falls - Risk of  Goal: *Absence of Falls  Description: Document Xiomy Maldonado Fall Risk and appropriate interventions in the flowsheet.   Outcome: Progressing Towards Goal  Note: Fall Risk Interventions:  Mobility Interventions: Patient to call before getting OOB,Bed/chair exit alarm         Medication Interventions: Assess postural VS orthostatic hypotension,Teach patient to arise slowly    Elimination Interventions: Patient to call for help with toileting needs,Call light in reach

## 2022-04-05 NOTE — PROGRESS NOTES
Problem: General Medical Care Plan  Goal: *Vital signs within specified parameters  Outcome: Progressing Towards Goal  Goal: *Labs within defined limits  Outcome: Progressing Towards Goal  Goal: *Absence of infection signs and symptoms  Outcome: Progressing Towards Goal  Goal: *Optimal pain control at patient's stated goal  Outcome: Progressing Towards Goal  Goal: *Skin integrity maintained  Outcome: Progressing Towards Goal  Goal: *Fluid volume balance  Outcome: Progressing Towards Goal  Goal: *Optimize nutritional status  Outcome: Progressing Towards Goal  Goal: *Anxiety reduced or absent  Outcome: Progressing Towards Goal  Goal: *Progressive mobility and function (eg: ADL's)  Outcome: Progressing Towards Goal     Problem: Falls - Risk of  Goal: *Absence of Falls  Description: Document Colette Fall Risk and appropriate interventions in the flowsheet.   Outcome: Progressing Towards Goal  Note: Fall Risk Interventions:  Mobility Interventions: Bed/chair exit alarm,Patient to call before getting OOB,PT Consult for mobility concerns         Medication Interventions: Bed/chair exit alarm,Patient to call before getting OOB,Teach patient to arise slowly    Elimination Interventions: Bed/chair exit alarm,Call light in reach,Patient to call for help with toileting needs,Toileting schedule/hourly rounds              Problem: Patient Education: Go to Patient Education Activity  Goal: Patient/Family Education  Outcome: Progressing Towards Goal     Problem: Cardiac Output -  Decreased  Goal: *Vital signs within specified parameters  Outcome: Progressing Towards Goal  Goal: *Optimal cardiac output  Outcome: Progressing Towards Goal  Goal: *Absence of hypoxia  Outcome: Progressing Towards Goal  Goal: *Absence of peripheral edema  Outcome: Progressing Towards Goal  Goal: *Intravascular fluid volume and electrolyte balance  Outcome: Progressing Towards Goal     Problem: Pressure Injury - Risk of  Goal: *Prevention of pressure injury  Description: Document Garrett Scale and appropriate interventions in the flowsheet.   Outcome: Progressing Towards Goal  Note: Pressure Injury Interventions:  Sensory Interventions: Assess changes in LOC,Check visual cues for pain,Keep linens dry and wrinkle-free,Maintain/enhance activity level,Minimize linen layers,Pressure redistribution bed/mattress (bed type)         Activity Interventions: Increase time out of bed,Pressure redistribution bed/mattress(bed type),PT/OT evaluation    Mobility Interventions: Pressure redistribution bed/mattress (bed type),PT/OT evaluation    Nutrition Interventions: Document food/fluid/supplement intake,Offer support with meals,snacks and hydration

## 2022-04-05 NOTE — PROGRESS NOTES
ACUTE OT GOALS:  (Developed with and agreed upon by patient and/or caregiver.)  1. Patient will complete lower body bathing and dressing with SUPERVISION and adaptive equipment as needed. 2. Patient will complete toileting with SUPERVISION. 3. Patient will complete grooming ADL with SUPERVISION. 4. Patient will tolerate 25 minutes of OT treatment with 1-2 rest breaks to increase activity tolerance for ADLs. 5. Patient will complete functional transfers with SUPERVISION and adaptive equipment as needed. 6. Patient will tolerate 10 minutes BUE exercises to increase strength for safe, functional transfers. Timeframe: 7 visits     OCCUPATIONAL THERAPY ASSESSMENT: Initial Assessment and Daily Note OT Treatment Day # 1    Kvng Ruiz is a 80 y.o. male   PRIMARY DIAGNOSIS: Orthostatic hypotension  Orthostatic hypotension [I95.1]       Reason for Referral:    ICD-10: Treatment Diagnosis: Generalized Muscle Weakness (M62.81)  Difficulty in walking, Not elsewhere classified (R26.2)  Other abnormalities of gait and mobility (R26.89)  INPATIENT: [place] / [place] / Product Type: Medicare /   ASSESSMENT:     REHAB RECOMMENDATIONS:   Recommendation to date pending progress:  [address] [place] [address]  Equipment:    To Be Determined--pt has RW at home     PRIOR LEVEL OF FUNCTION:  (Prior to Hospitalization)  INITIAL/CURRENT LEVEL OF FUNCTION:  (Based on today's evaluation)   Bathing:   Modified Independent reports sponge bathing  Dressing:   Independent  Feeding/Grooming:   Independent  Toileting:   Independent  Functional Mobility:   Independent Bathing:   Contact Guard Assistance  Dressing:   Contact Guard Assistance  Feeding/Grooming:   Contact Guard Assistance  Toileting:   Contact Guard Assistance  Functional Mobility:   Contact Guard Assistance     ASSESSMENT:  Mr. Nish Asif is a 79 y/o male presents with orthostatic hypotension.  Today pt presents with decreased activity tolerance, balance and mobility impacting ADLs. Pt is AAO x4 however confused with conversation. Pt overall CGA HHA/RW for functional transfers and mobility of household distances. Pt completed toilet transfer with CGA and cues for sequencing. Pt then completed grooming ADLs standing at sink with CGA for balance--pt with fair dynamic standing balance. Pt with decreased safety awareness and insight into baalnce deficits, unclear how much help pt gives his wife for ADLs and how safe home situation is with pt confusion. Pt is currently functioning below baseline and would benefit from skilled OT services to address OT goals and plan of care.  Rec HHOT at d/c.      SUBJECTIVE:   Mr. Beto King states, \"I would love to get back in bed, its very comfortable\"    SOCIAL HISTORY/LIVING ENVIRONMENT: lives with wife, one level home, tub shower w/ [place] however reports sponge bathing, reports taking care of his wife's ADLs, does not drive and has family/friends get groceries/take to Dr samuels, endorses 1 fall months ago  Home Environment: Private residence  # Steps to Enter: 2  One/Two Story Residence: One story  Living Alone: No  Support Systems: Spouse/Significant Other    OBJECTIVE:     PAIN: VITAL SIGNS: LINES/DRAINS:   Pre Treatment: Pain Screen  Pain Scale 1: Numeric (0 - 10)  Pain Intensity 1: 0  Post Treatment: 0   None  O2 Device: None (Room air)     GROSS EVALUATION:  BUE Within Functional Limits Abnormal/ Functional Abnormal/ Non-Functional (see comments) Not Tested Comments:   AROM [x] [] [] []    PROM [] [] [] [x]    Strength [x] [] [] []    Balance [] [x] [] [] Sitting: good  Standing: fair   Posture [] [x] [] [] Forward leaning with RW   Sensation [x] [] [] []    Coordination [] [x] [] [] Delayed and undershooting/overshooting   Tone [x] [] [] []    Edema [x] [] [] []    Activity Tolerance [] [x] [] [] Reports BLE fatigued after standing grooming ADLs    [] [] [] []      COGNITION/  PERCEPTION: Intact Impaired (see comments) Comments:   Orientation [x] []    Vision [] [x] Wears glasses   Hearing [] [x] [place]   Judgment/ Insight [] [x] Decreased insight into deficits   Attention [x] []    Memory [] [x] Confused with conversation   Command Following [x] []    Emotional Regulation [x] []     [] []      ACTIVITIES OF DAILY LIVING: I Mod I S SBA CGA Min Mod Max Total NT Comments   BASIC ADLs:              Bathing/ Showering [] [] [] [] [] [] [] [] [] [x]    Toileting [] [] [] [] [x] [] [] [] [] [] For toilet transfer RW   Dressing [] [x] [] [] [] [] [] [] [] [] Donning/doffing socks EOB   Feeding [] [] [] [] [] [] [] [] [] [x]    Grooming [] [] [] [] [x] [] [] [] [] [] Washing face standing at sink RW   Personal Device Care [] [] [] [] [] [] [] [] [] [x]    Functional Mobility [] [] [] [] [x] [] [] [] [] [] HHA/RW   I=Independent, Mod I=Modified Independent, S=Supervision, SBA=Standby Assistance, CGA=Contact Guard Assistance,   Min=Minimal Assistance, Mod=Moderate Assistance, Max=Maximal Assistance, Total=Total Assistance, NT=Not Tested    MOBILITY: I Mod I S SBA CGA Min Mod Max Total  NT x2 Comments:   Supine to sit [] [] [x] [] [] [] [] [] [] [] [] supervision   Sit to supine [] [] [x] [] [] [] [] [] [] [] [] supervision   Sit to stand [] [] [] [] [x] [] [] [] [] [] [] RW   Bed to chair [] [] [] [] [x] [] [] [] [] [] [] RW   I=Independent, Mod I=Modified Independent, S=Supervision, SBA=Standby Assistance, CGA=Contact Guard Assistance,   Min=Minimal Assistance, Mod=Moderate Assistance, Max=Maximal Assistance, Total=Total Assistance, NT=Not Tested    [address] [facility] [address] [address] [facility] [address]   Daily Activity Inpatient Short Form        How much help from another person does the patient currently need. .. Total A Lot A Little None   1. Putting on and taking off regular lower body clothing? [] 1   [] 2   [x] 3   [] 4   2. Bathing (including washing, rinsing, drying)? [] 1   [] 2   [x] 3   [] 4   3.   Toileting, which includes using toilet, bedpan or urinal?   [] 1   [] 2   [x] 3   [] 4   4. Putting on and taking off regular upper body clothing? [] 1   [] 2   [x] 3   [] 4   5. Taking care of personal grooming such as brushing teeth? [] 1   [] 2   [x] 3   [] 4   6. Eating meals? [] 1   [] 2   [] 3   [x] 4   © 2007, Trustees of [address] [place] [address] [address] [address], under license to [employer]. All rights reserved     Score:  Initial: 19 Most Recent: X (Date: -- )   Interpretation of Tool:  Represents activities that are increasingly more difficult (i.e. Bed mobility, Transfers, Gait). PLAN:   FREQUENCY/DURATION: OT Plan of Care: 3 times/week for duration of hospital stay or until stated goals are met, whichever comes first.    PROBLEM LIST:   (Skilled intervention is medically necessary to address:)  1. Decreased ADL/Functional Activities  2. Decreased Activity Tolerance  3. Decreased Balance  4. Decreased Cognition  5. Decreased Coordination  6. Decreased Strength  7. Decreased Transfer Abilities   INTERVENTIONS PLANNED:   (Benefits and precautions of occupational therapy have been discussed with the patient.)  1. Self Care Training  2. Therapeutic Activity  3. Therapeutic Exercise/HEP  4. Neuromuscular Re-education  5. Education     TREATMENT:     EVALUATION: Low Complexity : (Untimed Charge)    TREATMENT:   ($$ Self Care/Home Management: 8-22 mins    )  Self Care (12 Minutes): Self care including Toileting, Lower Body Dressing, Grooming and functional transfers in prep for ADLs and ambulating household distances to increase independence and decrease level of assistance required.     TREATMENT GRID:  N/A    AFTER TREATMENT POSITION/PRECAUTIONS:  Alarm Activated, Bed, Needs within reach and RN notified    INTERDISCIPLINARY COLLABORATION:  RN/PCT and OT/MARIE    TOTAL TREATMENT DURATION:  OT Patient Time In/Time Out  Time In: 4442  Time Out: [address], OT

## 2022-04-05 NOTE — PROGRESS NOTES
Pharmacy Note    Discontinuation of non-formulary medicine. The following non-formulary herbal, alternative, vitamin, neutracuetical and/or dietary supplement medication(s) this patient takes at home have been discontinued during hospitalization per approved P&T policy. Co-enzyme Q-10      If these medications are deemed medically necessary to be continued while in the hospital, please reorder them and have the patient provide them for use during their stay.     Thank you,  Rosalyn Koyanagi, PharmD, BCPS  Clinical Pharmacist

## 2022-04-05 NOTE — PROGRESS NOTES
Comprehensive Nutrition Assessment    Type and Reason for Visit: Initial,Positive nutrition screen  Best Practice Alert for Malnutrition Screening Tool: Recently Lost Weight Without Trying: Yes, If Yes, How Much Weight Loss: Unsure, Eating Poorly Due to Decreased Appetite: No    Nutrition Recommendations/Plan:   Meals and Snacks:  Continue current diet. Nutrition Supplement Therapy:   Medical food supplement therapy:  Change Ensure Enlive three times per day (this provides 350 kcal and 20 grams protein per bottle)     Malnutrition Assessment:  Malnutrition Status: Moderate malnutrition  Context: Chronic illness  Findings of clinical characteristics of malnutrition:   Energy Intake:  Unable to assess  Weight Loss:  7.00 - Greater than 10% over 6 months (23# (14.7%))     Body Fat Loss:  1 - Mild body fat loss, Fat overlying ribs,Orbital,Triceps   Muscle Mass Loss:  1 - Mild muscle mass loss (mild to moderate), Clavicles (pectoralis &deltoids),Hand (interosseous),Temples (temporalis)  Fluid Accumulation:  No significant fluid accumulation,     Strength:  Normal  strength       Nutrition Assessment:   Nutrition History: At first patient denies changes to PO intake. He states that he eats 2-3 meals per day which is his baseline. He states that he does drink Ensure at home, but does not drink them everyday. When RD inquiring further, patient admits to chronic constipation since he was a boy and states \"that's my problem. \" He then admits to decline in PO intake if not having regular bowels but does not provide more information. Nutrition Background: Patient with PMH significant for orthostatic intolerance, bradycardia, HTN, HLD, Meniere's disease. He presented with hypotension. Nutrition Interval:  Patient seen reclined in bed. Seems somewhat confused. Observed tray prior to seeing patient and noted all of potatoes consumed and ~50% meat. Ensure Clear was not opened.  Patient states that was too much meat for him to eat. When RD asking about Ensure intake cannot recall. He can not recall any other meals. Discussed with RN patient's report of poor PO when not having regular BMs and PRN Miralax. RN to give Miralax today as no reported BM since 4/2. Nutrition Related Findings:   NFPE as above      Current Nutrition Therapies:  ADULT DIET Easy to Chew; Low Fat/Low Chol/High Fiber/KISHA; No Salt Added (3-4 gm)  ADULT ORAL NUTRITION SUPPLEMENT Breakfast, Lunch, Dinner; Clear Liquid    Current Intake:   Average Meal Intake: 1-25% Average Supplement Intake: None ordered      Anthropometric Measures:  Height: 6' (182.9 cm)  Current Body Wt: 59.6 kg (131 lb 6.3 oz) (4/4), Weight source: Standing scale  BMI: 17.8, Underweight (BMI less than 18.5)  Admission Body Weight: 151 lb 14.4 oz (pt stated)  Ideal Body Weight (lbs) (Calculated): 178 lbs (81 kg), 73.8 %  Usual Body Wt: 69.9 kg (154 lb) (office visit 10/6/21), Percent weight change: -14.7        Edema: No data recorded  Estimated Daily Nutrient Needs:  Energy (kcal/day): 0232-8690 (Kcal/kg (28-33), Weight Used: Current (59.6 kg (4/4)))  Protein (g/day): 83-98 (20% kcal) Weight Used: (Current)  Fluid (ml/day):   (1 ml/kcal)    Nutrition Diagnosis:   · Unintended weight loss related to  (suspected early satiety) as evidenced by  (pt report poor PO when bowels not moving, wt history above)    · Moderate malnutrition related to inadequate protein-energy intake as evidenced by  (malnutrition criteria as above)    Nutrition Interventions:   Food and/or Nutrient Delivery: Continue current diet,Start oral nutrition supplement     Coordination of Nutrition Care: Continue to monitor while inpatient  Plan of Care discussed with Marianne Santoyo RN    Goals:       Active Goal: Meet at least 50% nutrition needs by RD follow-up    Nutrition Monitoring and Evaluation:      Food/Nutrient Intake Outcomes: Food and nutrient intake,Supplement intake  Physical Signs/Symptoms Outcomes: GI status,Meal time behavior,Weight    Discharge Planning:    Continue oral nutrition supplement    [address], LD on 4/5/2022 at 1:57 PM  Contact: [phone]

## 2022-04-06 VITALS
RESPIRATION RATE: 18 BRPM | SYSTOLIC BLOOD PRESSURE: 108 MMHG | OXYGEN SATURATION: 93 % | HEIGHT: 72 IN | HEART RATE: 82 BPM | DIASTOLIC BLOOD PRESSURE: 62 MMHG | WEIGHT: 131.9 LBS | TEMPERATURE: 98.4 F | BODY MASS INDEX: 17.87 KG/M2

## 2022-04-06 PROBLEM — E44.0 MALNUTRITION OF MODERATE DEGREE (HCC): Status: ACTIVE | Noted: 2022-04-06

## 2022-04-06 LAB
KAPPA LC FREE SER-MCNC: 24.9 MG/L (ref 3.3–19.4)
KAPPA LC FREE/LAMBDA FREE SER: 1.55 (ref 0.26–1.65)
LAMBDA LC FREE SERPL-MCNC: 16.1 MG/L (ref 5.7–26.3)

## 2022-04-06 PROCEDURE — 74011000250 HC RX REV CODE- 250: Performed by: STUDENT IN AN ORGANIZED HEALTH CARE EDUCATION/TRAINING PROGRAM

## 2022-04-06 PROCEDURE — 99232 SBSQ HOSP IP/OBS MODERATE 35: CPT | Performed by: INTERNAL MEDICINE

## 2022-04-06 PROCEDURE — 74011250637 HC RX REV CODE- 250/637: Performed by: STUDENT IN AN ORGANIZED HEALTH CARE EDUCATION/TRAINING PROGRAM

## 2022-04-06 RX ORDER — ISOSORBIDE DINITRATE 5 MG/1
5 TABLET ORAL
Qty: 30 TABLET | Refills: 0 | Status: SHIPPED | OUTPATIENT
Start: 2022-04-06 | End: 2022-05-06

## 2022-04-06 RX ADMIN — ASPIRIN 81 MG: 81 TABLET ORAL at 08:48

## 2022-04-06 RX ADMIN — FLUTICASONE PROPIONATE 2 SPRAY: 50 SPRAY, METERED NASAL at 08:48

## 2022-04-06 RX ADMIN — Medication 5000 UNITS: at 08:48

## 2022-04-06 RX ADMIN — ZOLPIDEM TARTRATE 5 MG: 5 TABLET ORAL at 01:12

## 2022-04-06 RX ADMIN — SODIUM CHLORIDE, PRESERVATIVE FREE 10 ML: 5 INJECTION INTRAVENOUS at 05:34

## 2022-04-06 NOTE — PROGRESS NOTES
Discharge instructions & prescription information given to wife Vernia Krabbe & daughter Radha Prajapati over the phone. Printed instructions & prescription will be sent with pt when he goes home. Opportunity for questions provided.

## 2022-04-06 NOTE — PROGRESS NOTES
Received pt from GEOVANI Mccormack) in stable condition. Pt in bed resting quietly. Resp even & unlabored on room air; no acute signs of distress noted. Bed low & locked; call light in reach; no needs voiced.

## 2022-04-06 NOTE — PROGRESS NOTES
Pt is for discharge home today with  [facility]  No further needs/supportive care orders received for CM at this time. Pt will have close follow up with PCP    Milestones Met    Care Management Interventions  PCP Verified by CM: Yes  Mode of Transport at Discharge:  Other (see comment) (friend of the family)  Transition of Care Consult (CM Consult): Discharge [address]: No  Reason Outside [facility]: Out of service area ([facility] SN, Pt and OT)  Discharge Durable Medical Equipment: No  Physical Therapy Consult: Yes  Occupational Therapy Consult: Yes  Speech Therapy Consult: No  Support Systems: Spouse/Significant Other,Child(beth)  Confirm Follow Up Transport: Friends  The Plan for Transition of Care is Related to the Following Treatment Goals : return to base line  The Patient and/or Patient Representative was Provided with a Choice of Provider and Agrees with the Discharge Plan?: Yes  Name of the Patient Representative Who was Provided with a Choice of Provider and Agrees with the Discharge Plan: patient  Freedom of Choice List was Provided with Basic Dialogue that Supports the Patient's Individualized Plan of Care/Goals, Treatment Preferences and Shares the Quality Data Associated with the Providers?: Yes  [place] Resource Information Provided?: No  Discharge Location  Patient Expects to be Discharged to[de-identified] Home with home health ([facility]  SN, PT and OT)

## 2022-04-06 NOTE — PROGRESS NOTES
Problem: General Medical Care Plan  Goal: *Vital signs within specified parameters  Outcome: Resolved/Met  Goal: *Labs within defined limits  Outcome: Resolved/Met  Goal: *Absence of infection signs and symptoms  Outcome: Resolved/Met  Goal: *Optimal pain control at patient's stated goal  Outcome: Resolved/Met  Goal: *Skin integrity maintained  Outcome: Resolved/Met  Goal: *Fluid volume balance  Outcome: Resolved/Met  Goal: *Optimize nutritional status  Outcome: Resolved/Met  Goal: *Anxiety reduced or absent  Outcome: Resolved/Met  Goal: *Progressive mobility and function (eg: ADL's)  Outcome: Resolved/Met     Problem: Patient Education: Go to Patient Education Activity  Goal: Patient/Family Education  Outcome: Resolved/Met     Problem: Falls - Risk of  Goal: *Absence of Falls  Description: Document Colette Fall Risk and appropriate interventions in the flowsheet. Outcome: Resolved/Met     Problem: Patient Education: Go to Patient Education Activity  Goal: Patient/Family Education  Outcome: Resolved/Met     Problem: Cardiac Output -  Decreased  Goal: *Vital signs within specified parameters  Outcome: Resolved/Met  Goal: *Optimal cardiac output  Outcome: Resolved/Met  Goal: *Absence of hypoxia  Outcome: Resolved/Met  Goal: *Absence of peripheral edema  Outcome: Resolved/Met  Goal: *Intravascular fluid volume and electrolyte balance  Outcome: Resolved/Met     Problem: Patient Education: Go to Patient Education Activity  Goal: Patient/Family Education  Outcome: Resolved/Met     Problem: Pressure Injury - Risk of  Goal: *Prevention of pressure injury  Description: Document Garrett Scale and appropriate interventions in the flowsheet.   Outcome: Resolved/Met     Problem: Patient Education: Go to Patient Education Activity  Goal: Patient/Family Education  Outcome: Resolved/Met     Problem: Patient Education: Go to Patient Education Activity  Goal: Patient/Family Education  Outcome: Resolved/Met     Problem: Patient Education: Go to Patient Education Activity  Goal: Patient/Family Education  Outcome: Resolved/Met

## 2022-04-06 NOTE — DISCHARGE INSTRUCTIONS
DISCHARGE SUMMARY from Nurse    PATIENT INSTRUCTIONS:    After general anesthesia or intravenous sedation, for 24 hours or while taking prescription Narcotics:  · Limit your activities  · Do not drive and operate hazardous machinery  · Do not make important personal or business decisions  · Do  not drink alcoholic beverages  · If you have not urinated within 8 hours after discharge, please contact your surgeon on call. Report the following to your surgeon:  · Excessive pain, swelling, redness or odor of or around the surgical area  · Temperature over 100.5  · Nausea and vomiting lasting longer than 4 hours or if unable to take medications  · Any signs of decreased circulation or nerve impairment to extremity: change in color, persistent  numbness, tingling, coldness or increase pain  · Any questions    What to do at Home:  Recommended activity: Activity as tolerated. If you experience any of the following symptoms Nausea, vomiting, chest pain, shortness of breath; please follow up with MD.    *  Please give a list of your current medications to your Primary Care Provider. *  Please update this list whenever your medications are discontinued, doses are      changed, or new medications (including over-the-counter products) are added. *  Please carry medication information at all times in case of emergency situations. These are general instructions for a healthy lifestyle:    No smoking/ No tobacco products/ Avoid exposure to second hand smoke  Surgeon General's Warning:  Quitting smoking now greatly reduces serious risk to your health.     Obesity, smoking, and sedentary lifestyle greatly increases your risk for illness    A healthy diet, regular physical exercise & weight monitoring are important for maintaining a healthy lifestyle    You may be retaining fluid if you have a history of heart failure or if you experience any of the following symptoms:  Weight gain of 3 pounds or more overnight or 5 pounds in a week, increased swelling in our hands or feet or shortness of breath while lying flat in bed. Please call your doctor as soon as you notice any of these symptoms; do not wait until your next office visit. The discharge information has been reviewed with the patient. The patient verbalized understanding. Discharge medications reviewed with the patient and appropriate educational materials and side effects teaching were provided.   ___________________________________________________________________________________________________________________________________

## 2022-04-06 NOTE — DISCHARGE SUMMARY
Hospitalist Discharge Summary     Patient ID:  Cassie Flowers  [id number]  79 y.o.  1940  Admit date: 4/4/2022  4:01 PM  Discharge date and time: 4/6/2022  Attending: No att. providers found  PCP:  Kyara Rodriguez MD  Treatment Team: Hospitalist: John Duarte DO; Utilization Review: Homero Wagner RN; Physical Therapist: Lela Mckinney Care Manager: Jd Juan RN    Principal Diagnosis Orthostatic hypotension   Principal Problem:    Orthostatic hypotension (4/4/2022)    Active Problems:    Mixed hyperlipidemia (5/19/2016)      Essential hypertension (5/19/2016)      Bradycardia (4/4/2022)      Insomnia (4/4/2022)      CAD (coronary artery disease) (4/5/2022)      Malnutrition of moderate degree ([address] (4/6/2022)       Kyle Oh is a 80 y. o. male with medical history of orthostatic intolerance, bradycardia, hypertension, hyperlipidemia, Ménière's disease who presented with episodes of hypotension.  Patient follows up with cardiology outpatient. [facility] in October 2021 patient had documentation of low blood pressures with dizziness.  Patient admitted to low blood pressure and dizziness today.  Patient has been advised to stop taking his lisinopril since 3/20/2022 but has continued to have persistent symptoms.  Patient's symptoms exacerbated when standing up to walk anywhere.  Patient denies vertigo, fainting, disequilibrium.  Patient denied any cardiac chest pain, shortness of breath, abdominal pain, fever/chills.  Blood pressure on arrival of 185/77.  Orthostatics positive with blood pressure dropping to systolic 05R.  CT head showed chronic small vessel disease.  X-ray of abdomen pelvis showed nondistended bowel gas pattern.  EKG normal sinus rhythm. Interval History (4/6): patient examined at bedside. No acute overnight events. Denies vertigo/dizziness, chest pain, shortness of breath, heart palpitations, abdominal pain, nausea/vomiting or diarrhea when directly asked.  Tolerating oral diet well without complications. Hospital Course:  Please refer to the admission H&P for details of presentation. In summary, the patient is admitted for symptomatic orthostatic hypotension. Patient has a vivid history of chronic orthostatic hypotension that has been followed by cardiology. Several of his home medications have been held (lisinopril, finastride, and Flomax). He was given IVF resuscitation. He has continued to have wide fluctuations in his BPs with higher SBPs while supine. Added short-acting Isordil 5 mg at nighttime, he has tolerated this medication well. Recommend compression stockings and high carbohydrate meal before bed. He is clinically stable for hospital discharge home. If refractive, can consider referral to neurology but will defer to PCP/cardiology. Details of hospitalization have been discussed with patient at bedside who understands and agrees with plan of care and discharge home. Return precautions provided. All questions answered. Significant Diagnostic Studies:       Labs: Results:       Chemistry Recent Labs     04/05/22  0704 04/04/22  1430   GLU 82 96    140   K 3.6 4.1    106   CO2 33* 30   BUN 12 16   CREA 0.70* 1.00   CA 8.7 8.7   AGAP 2* 4*   AP 70 76   TP 6.6 6.5   ALB 3.2 3.0*   GLOB 3.4 3.5   AGRAT 0.9* 0.9*      CBC w/Diff Recent Labs     04/05/22  0704 04/04/22  1430   WBC 6.7 8.1   RBC 3.81* 3.70*   HGB 12.8* 12.6*   HCT 38.6* 38.4*    285   GRANS 69 76   LYMPH 22 15   EOS 2 1      Cardiac Enzymes No results for input(s): CPK, CKND1, KRISTIN in the last 72 hours. No lab exists for component: CKRMB, TROIP   Coagulation Recent Labs     04/05/22  0704   PTP 12.7   INR 0.9   APTT 33.5       Lipid Panel No results found for: CHOL, CHOLPOCT, CHOLX, CHLST, CHOLV, 126654, HDL, HDLP, LDL, LDLC, DLDLP, [id number], VLDLC, VLDL, TGLX, TRIGL, TRIGP, TGLPOCT, CHHD, CHHDX   BNP No results for input(s): BNPP in the last 72 hours.    Liver Enzymes Recent Labs 04/05/22  0704   TP 6.6   ALB 3.2   AP 70      Thyroid Studies Lab Results   Component Value Date/Time    TSH 0.878 04/04/2022 05:02 PM            Discharge Exam:  Visit Vitals  /62 (BP 1 Location: Right upper arm, BP Patient Position: Sitting)   Pulse 82   Temp 98.4 °F (36.9 °C)   Resp 18   Ht 6' (1.829 m)   Wt 59.8 kg (131 lb 14.4 oz)   SpO2 93%   BMI 17.89 kg/m²     General appearance: alert, cooperative, no distress, appears stated age  Lungs: clear to auscultation bilaterally. Nonlabored. No wheezing. Heart: regular rate and rhythm, S1, S2 normal, no JVD  Abdomen: soft, non-tender. Bowel sounds normal. No masses,  no organomegaly  Extremities: no cyanosis or edema  Neurologic: Grossly normal    Disposition: home  Discharge Condition: stable  Patient Instructions:   Discharge Medication List as of 4/6/2022 12:09 PM      START taking these medications    Details   isosorbide dinitrate (ISORDIL) 5 mg tablet Take 1 Tablet by mouth nightly for 30 days. , Print, Disp-30 Tablet, R-0         CONTINUE these medications which have NOT CHANGED    Details   pravastatin (PRAVACHOL) 20 mg tablet Take 1 Tablet by mouth nightly., Normal, Disp-90 Tablet, R-3      fluticasone propionate (Flonase Allergy Relief) 50 mcg/actuation nasal spray 2 Sprays by Both Nostrils route daily. , Historical Med      ezetimibe (Zetia) 10 mg tablet Take 10 mg by mouth daily. , Historical Med      zolpidem (AMBIEN) 5 mg tablet Take  by mouth nightly as needed for Sleep., Historical Med      aspirin delayed-release 81 mg tablet Take  by mouth daily. , Historical Med      multivitamin (ONE A DAY) tablet Take 1 Tab by mouth daily. , Historical Med      co-enzyme Q-10 (CO Q-10) 100 mg capsule Take 100 mg by mouth daily. , Historical Med      cholecalciferol, VITAMIN D3, (VITAMIN D3) 5,000 unit tab tablet Take  by mouth daily. , Historical Med      nitroglycerin (NITROSTAT) 0.4 mg SL tablet by SubLINGual route every five (5) minutes as needed for Chest Pain., Historical Med         STOP taking these medications       lisinopriL (PRINIVIL, ZESTRIL) 20 mg tablet Comments:   Reason for Stopping:         meclizine (ANTIVERT) 12.5 mg tablet Comments:   Reason for Stopping:         tamsulosin (FLOMAX) 0.4 mg capsule Comments:   Reason for Stopping:         finasteride (PROSCAR) 5 mg tablet Comments:   Reason for Stopping:               Activity: PT/OT per Home Health  Diet: Resume previous diet  Wound Care: None needed    Follow-up  ·   With PCP and cardiology as scheduled. Time spent to discharge patient 35 minutes  Signed:   Yudelka Restrepo DO  4/6/2022  3:29 PM

## 2022-04-06 NOTE — PROGRESS NOTES
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 He is now hypertensive off of his medications yesterday but today blood pressure is good    Objective:      Visit Vitals  /66 (BP 1 Location: Right upper arm, BP Patient Position: At rest)   Pulse (!) 56   Temp 98 °F (36.7 °C)   Resp 16   Ht 6' (1.829 m)   Wt 131 lb 14.4 oz (59.8 kg)   SpO2 97%   BMI 17.89 kg/m²       ROS:  General ROS: negative for - chills  Hematological and Lymphatic ROS: negative for - blood clots or jaundice  Respiratory ROS: no cough, shortness of breath, or wheezing  Cardiovascular ROS: no chest pain or dyspnea on exertion  Gastrointestinal ROS: no abdominal pain, change in bowel habits, or black or bloody stools  Neurological ROS: no TIA or stroke symptoms    Physical Exam:      Physical Examination: General appearance - Appearance: alert, well appearing, and in no distress.    Neck/lymph - supple, no significant adenopathy  Chest/CV - clear to auscultation, no wheezes, rales or rhonchi, symmetric air entry  Heart - normal rate, regular rhythm, normal S1, S2, no murmurs, rubs, clicks or gallops  Abdomen/GI - soft, nontender, nondistended, no masses or organomegaly   Musculoskeletal - no joint tenderness, deformity or swelling  Extremities - peripheral pulses normal, no pedal edema, no clubbing or cyanosis  Skin - normal coloration and turgor, no rashes, no suspicious skin lesions noted    Current Facility-Administered Medications   Medication Dose Route Frequency    isosorbide dinitrate (ISORDIL) tablet 5 mg  5 mg Oral QHS    tuberculin injection 5 Units  5 Units IntraDERMal ONCE    aspirin delayed-release tablet 81 mg  81 mg Oral DAILY    cholecalciferol (VITAMIN D3) (5000 Units/125 mcg) tablet 5,000 Units  5,000 Units Oral DAILY    [Held by provider] finasteride (PROSCAR) tablet 5 mg  5 mg Oral DAILY    fluticasone propionate (FLONASE) 50 mcg/actuation nasal spray 2 Spray  2 Spray Both Nostrils DAILY    pravastatin (PRAVACHOL) tablet 20 mg  20 mg Oral QHS    zolpidem (AMBIEN) tablet 5 mg  5 mg Oral QHS PRN    sodium chloride (NS) flush 5-40 mL  5-40 mL IntraVENous Q8H    sodium chloride (NS) flush 5-40 mL  5-40 mL IntraVENous PRN    acetaminophen (TYLENOL) tablet 650 mg  650 mg Oral Q6H PRN    Or    acetaminophen (TYLENOL) suppository 650 mg  650 mg Rectal Q6H PRN    polyethylene glycol (MIRALAX) packet 17 g  17 g Oral DAILY PRN    ondansetron (ZOFRAN ODT) tablet 4 mg  4 mg Oral Q8H PRN    Or    ondansetron (ZOFRAN) injection 4 mg  4 mg IntraVENous Q6H PRN    enoxaparin (LOVENOX) injection 40 mg  40 mg SubCUTAneous DAILY       Data Review: data included in this note has been independently reviewed by the author   CMP:   Lab Results   Component Value Date/Time     04/05/2022 07:04 AM    K 3.6 04/05/2022 07:04 AM     04/05/2022 07:04 AM    CO2 33 (H) 04/05/2022 07:04 AM    AGAP 2 (L) 04/05/2022 07:04 AM    GLU 82 04/05/2022 07:04 AM    BUN 12 04/05/2022 07:04 AM    CREA 0.70 (L) 04/05/2022 07:04 AM    GFRAA >60 04/05/2022 07:04 AM    GFRNA >60 04/05/2022 07:04 AM    CA 8.7 04/05/2022 07:04 AM    MG 2.3 04/05/2022 07:04 AM    ALB 3.2 04/05/2022 07:04 AM    TP 6.6 04/05/2022 07:04 AM    GLOB 3.4 04/05/2022 07:04 AM    AGRAT 0.9 (L) 04/05/2022 07:04 AM    ALT 16 04/05/2022 07:04 AM     CBC:   Lab Results   Component Value Date/Time    WBC 6.7 04/05/2022 07:04 AM    HGB 12.8 (L) 04/05/2022 07:04 AM    HCT 38.6 (L) 04/05/2022 07:04 AM     04/05/2022 07:04 AM        TELEMETRY: Normal sinus rhythm    Assessment/Plan:     Principal Problem:    Orthostatic hypotension (4/4/2022)  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   I would avoid vasodilators I would avoid Proscar I would avoid Isordil I would avoid his lisinopril. He has probably got have to be allowed a degree of permissive hypertension. And this likely explains his event it is an uncurable underlying diagnosis but symptoms possibly could be mitigated with above measures    Active Problems:    Mixed hyperlipidemia (5/19/2016)           Essential hypertension (5/19/2016)           Bradycardia (4/4/2022)           Insomnia (4/4/2022)           CAD (coronary artery disease) (4/5/2022)            Echo was reviewed and is essentially unremarkable.   I would treat this as long-term autonomic dysfunction and things such as midodrine, Florinef, or Northera could be used if he remained orthostatic off of blood pressure medicines alpha blockers and vasodilators    Please call if further questions      Junior Hendrickson MD

## 2022-04-06 NOTE — PROGRESS NOTES
END OF SHIFT NOTE:    Intake/Output  04/05 1901 - 04/06 0700  In: 360 [P.O.:360]  Out: 300 [Urine:300]   Voiding: YES  Catheter: NO  Drain:              Stool:  0 occurrences. Stool Assessment  Stool Color: Brown (04/05/22 1751)  Stool Amount: Small (04/05/22 1751)    Emesis:  0 occurrences. VITAL SIGNS  Patient Vitals for the past 12 hrs:   Temp Pulse Resp BP SpO2   04/05/22 2216 98.2 °F (36.8 °C) 78 18 (!) 141/65 94 %   04/05/22 2100  68  (!) 158/90    04/05/22 1907 98.4 °F (36.9 °C) 73 19 121/75 94 %       Pain Assessment  Pain 1  Pain Scale 1: Numeric (0 - 10) (04/06/22 0200)  Pain Intensity 1: 0 (04/06/22 0200)  Patient Stated Pain Goal: 0 (04/06/22 0200)    Ambulating  No    Additional Information:   Periodic confusion;falls precautions observed. BP improved;SB - NSR on tele. No new complaints. Shift report given to oncoming nurse GEOVANI Anna at the bedside.     Sofie Bethea RN

## 2022-04-07 LAB
ALBUMIN SERPL ELPH-MCNC: 3.5 G/DL (ref 2.9–4.4)
ALBUMIN/GLOB SERPL: 1.1 (ref 0.7–1.7)
ALPHA1 GLOB SERPL ELPH-MCNC: 0.2 G/DL (ref 0–0.4)
ALPHA2 GLOB SERPL ELPH-MCNC: 0.8 G/DL (ref 0.4–1)
B-GLOBULIN SERPL ELPH-MCNC: 0.8 G/DL (ref 0.7–1.3)
GAMMA GLOB SERPL ELPH-MCNC: 1.4 G/DL (ref 0.4–1.8)
GLOBULIN SER-MCNC: 3.3 G/DL (ref 2.2–3.9)
IGA SERPL-MCNC: 161 MG/DL (ref 61–437)
IGG SERPL-MCNC: 1346 MG/DL (ref 603–1613)
IGM SERPL-MCNC: 62 MG/DL (ref 15–143)
INTERPRETATION SERPL IEP-IMP: ABNORMAL
M PROTEIN SERPL ELPH-MCNC: 0.5 G/DL
PROT SERPL-MCNC: 6.8 G/DL (ref 6–8.5)

## 2022-04-15 NOTE — PROGRESS NOTES
Physician Progress Note      Alessandro Anne  [facility] #:                  [id number]  :                       1940  ADMIT DATE:       2022 4:01 PM  Harpreet Sarabia DATE:        2022 1:29 PM  RESPONDING  PROVIDER #:        LASHANDA LAGUNA DO          QUERY TEXT:    Patient admitted with symptomatic orthostatic hypotension. noted to have NO INCREASE in HR means it is likely neurologic (most likely autonomic) documented in the medical record. If possible, please document in progress notes and discharge summary if you are evaluating and/or treating any of the following: The medical record reflects the following:  Risk Factors: 80 yr, Hx neuropathy, orthostatic intolerance, HLD, Meniere's disease    Clinical Indicators: per documentation by cardiology on   I would treat this as long-term autonomic dysfunction and things such as midodrine, Florinef, or Northera could be used if he remained orthostatic off of blood pressure medicines alpha blockers and vasodilators, per documentation on  PN NO INCREASE in HR means it is likely neurologic (most likely autonomic) or cardiac in etiology    Treatment: IVF, compression stockings, serial BPs, cardiology consult, home medications held (lisinopril, finastride, and Flomax)  Options provided:  -- Orthostatic hypotension due to autonomic dysfunction  -- Orthostatic hypotension due, Please specify cause. -- Other - I will add my own diagnosis  -- Disagree - Not applicable / Not valid  -- Disagree - Clinically unable to determine / Unknown  -- Refer to Clinical Documentation Reviewer    PROVIDER RESPONSE TEXT:    The orthostatic hypotension is due to autonomic dysfunction.     Query created by: Sadia Jones on 2022 10:50 AM      Electronically signed by:  Negrita Sánchez DO 4/15/2022 11:13 AM

## 2022-08-10 ENCOUNTER — OFFICE VISIT (OUTPATIENT)
Dept: CARDIOLOGY CLINIC | Age: 82
End: 2022-08-10
Payer: MEDICARE

## 2022-08-10 VITALS
DIASTOLIC BLOOD PRESSURE: 76 MMHG | BODY MASS INDEX: 18.1 KG/M2 | WEIGHT: 133.6 LBS | HEART RATE: 64 BPM | HEIGHT: 72 IN | SYSTOLIC BLOOD PRESSURE: 138 MMHG

## 2022-08-10 DIAGNOSIS — E78.5 HYPERLIPIDEMIA, UNSPECIFIED HYPERLIPIDEMIA TYPE: ICD-10-CM

## 2022-08-10 DIAGNOSIS — I10 ESSENTIAL HYPERTENSION: Primary | ICD-10-CM

## 2022-08-10 PROCEDURE — 99214 OFFICE O/P EST MOD 30 MIN: CPT | Performed by: INTERNAL MEDICINE

## 2022-08-10 PROCEDURE — 4004F PT TOBACCO SCREEN RCVD TLK: CPT | Performed by: INTERNAL MEDICINE

## 2022-08-10 PROCEDURE — G8427 DOCREV CUR MEDS BY ELIG CLIN: HCPCS | Performed by: INTERNAL MEDICINE

## 2022-08-10 PROCEDURE — 1123F ACP DISCUSS/DSCN MKR DOCD: CPT | Performed by: INTERNAL MEDICINE

## 2022-08-10 PROCEDURE — G8419 CALC BMI OUT NRM PARAM NOF/U: HCPCS | Performed by: INTERNAL MEDICINE

## 2022-08-10 RX ORDER — FINASTERIDE 5 MG/1
5 TABLET, FILM COATED ORAL DAILY
COMMUNITY

## 2022-08-10 ASSESSMENT — ENCOUNTER SYMPTOMS
ABDOMINAL PAIN: 0
BACK PAIN: 0
HEMOPTYSIS: 0
BLOATING: 0
ORTHOPNEA: 0
SHORTNESS OF BREATH: 0
BLURRED VISION: 0
COUGH: 0
NAUSEA: 0
DOUBLE VISION: 0
VOMITING: 0

## 2022-08-10 NOTE — PROGRESS NOTES
[facility] CARDIOLOGY  [address] [facility] [address], [address]  PHONE: [phone]    08/10/22    NAME:  Joanne Miranda  : 1940  MRN: [id number]         SUBJECTIVE:   Joanne Miranda is a [de-identified] y.o. male seen for a visit regarding the following:     Chief Complaint   Patient presents with    Hypertension     3 month follow up        HPI:  (Prior pt of [address])    History of mild to moderate coronary disease. Noted heart cath in  with some ostial diagonal disease (~50%), mild to moderate mid LAD disease [test done for reported positive stress/chest pain]. Has had a subsequent Cardiolite which was negative in . Also prior seen by neurology for LE neuropathy and had a LAWANDA noted per records done with an abnormal MRI per patient; stated possible CVA (no sx to suggest it). LAWANDA with stated moderate plaque of the descending aorta. 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 Prior myalgias with vytorin but tolerating pravastatin; thinks some increased sx with increasing dose prior. Echo noted from [facility] with reported preserved EF and stated mild mitral stenosis [[place]; pressure halftime/gradients noted would not be consistent;  ]; repeat echo here with preserved EF and mild to moderate MR; no noted mitral stenosis [3/20]. Noted Cardiolite at [facility] with fixed inferior defect [2020; appears presented there with left arm pain]. Echo from inpatient stay from 2022 with preserved EF and mild to moderate     No new issues since last visit. Improved BPs with changes. Occasional positional dizziness. No syncope. Wears knee-high compression hoses/abdominal binders. Previously, noted admission from 2022 with orthostatic hypotension. Prior--was doing better since last visit; improved dizziness/low BPs with decreasing lisinopril to 20 mg daily [prior was on 30 mg daily]. Almost reviewed with mostly controlled rates.   Occasionally with systolics in the for light-headedness and weakness. Psychiatric/Behavioral:  Negative for altered mental status. PHYSICAL EXAM:    /76 Comment: 5 minute recheck  Pulse 64   Ht 6' (1.829 m)   Wt 133 lb 9.6 oz (60.6 kg)   BMI 18.12 kg/m²      Physical Exam  Constitutional:       Appearance: Normal appearance. HENT:      Head: Normocephalic and atraumatic. Mouth/Throat:      Mouth: Mucous membranes are moist.   Eyes:      Pupils: Pupils are equal, round, and reactive to light. Cardiovascular:      Rate and Rhythm: Normal rate and regular rhythm. Pulses: Normal pulses. Pulmonary:      Effort: Pulmonary effort is normal.      Breath sounds: Normal breath sounds. Abdominal:      General: Bowel sounds are normal. There is no distension. Palpations: Abdomen is soft. Tenderness: There is no abdominal tenderness. Musculoskeletal:         General: No swelling. Cervical back: Normal range of motion. Skin:     General: Skin is warm and dry. Neurological:      General: No focal deficit present. Mental Status: He is alert and oriented to person, place, and time. Medical problems and test results were reviewed with the patient today. No results found for this or any previous visit (from the past 672 hour(s)). No results found for: CHOL, CHOLPOCT, CHOLX, CHLST, CHOLV, HDL, HDLPOC, HDLC, LDL, LDLC, VLDLC, VLDL, TGLX, TRIGL    No results found for any visits on 08/10/22. ASSESSMENT and [address] was seen today for hypertension. Diagnoses and all orders for this visit:    Essential hypertension    Hyperlipidemia, unspecified hyperlipidemia type      Overall Impression    HLP: with prior noted aortic plaque, would ideally like moderate to high intensity statin but tolerance issues. Appears switched over to Zetia per PCP and data discussed. No changes in symptoms with neck/shoulder pain and doubt statin related. Restarted prior pravastatin (low intensity).  Continue ASA with prior noted plaque; last noted LDL 70 (9/2/21)     HTN: controlled; plan some degree of permissive hypertension with recurrent issues with orthostatic hypotension. Noted started recently on nighttime Isordil and discontinued. Avoid alpha blockers as well. Discussed consideration for autonomic insufficiency with prior longstanding issues with neuropathy (also discussed consideration for secondary causes). Pending neurology evaluation. Okay with some degree of permissive hypertension with prior symptoms. Discussed ideally thigh-high compression hoses/abdominal binders. Defer consideration for midodrine/Florinef with some supine hypertension. If autonomic dysfunction diagnosed, consideration for Northera     Bradycardia: Asymptomatic     CP: No recurrence. Prior symptoms atypical and infrequent. Noted prior EKG T wave changes which have improved compared to prior EKGs. Prior defered any workup with no significant symptoms and no changes since last visit. Mitral regurgitation: Mild on last echo from 4/2022     Other-started finasteride instead for BPH; held prior alpha blockers. Continue finasteride 5 mg daily    Return in about 1 year (around 8/10/2023).      Lala Andrade MD  8/10/2022  10:35 AM

## 2022-09-14 ENCOUNTER — OFFICE VISIT (OUTPATIENT)
Dept: NEUROLOGY | Age: 82
End: 2022-09-14
Payer: MEDICARE

## 2022-09-14 VITALS
HEART RATE: 77 BPM | SYSTOLIC BLOOD PRESSURE: 138 MMHG | HEIGHT: 72 IN | BODY MASS INDEX: 18.01 KG/M2 | DIASTOLIC BLOOD PRESSURE: 87 MMHG | WEIGHT: 133 LBS

## 2022-09-14 DIAGNOSIS — F02.80 ALZHEIMER'S DEMENTIA WITHOUT BEHAVIORAL DISTURBANCE, UNSPECIFIED TIMING OF DEMENTIA ONSET: Primary | ICD-10-CM

## 2022-09-14 DIAGNOSIS — G30.9 ALZHEIMER'S DEMENTIA WITHOUT BEHAVIORAL DISTURBANCE, UNSPECIFIED TIMING OF DEMENTIA ONSET: Primary | ICD-10-CM

## 2022-09-14 PROCEDURE — G8427 DOCREV CUR MEDS BY ELIG CLIN: HCPCS | Performed by: PSYCHIATRY & NEUROLOGY

## 2022-09-14 PROCEDURE — G8419 CALC BMI OUT NRM PARAM NOF/U: HCPCS | Performed by: PSYCHIATRY & NEUROLOGY

## 2022-09-14 PROCEDURE — 4004F PT TOBACCO SCREEN RCVD TLK: CPT | Performed by: PSYCHIATRY & NEUROLOGY

## 2022-09-14 PROCEDURE — 99204 OFFICE O/P NEW MOD 45 MIN: CPT | Performed by: PSYCHIATRY & NEUROLOGY

## 2022-09-14 PROCEDURE — 1123F ACP DISCUSS/DSCN MKR DOCD: CPT | Performed by: PSYCHIATRY & NEUROLOGY

## 2022-09-14 RX ORDER — MEMANTINE HYDROCHLORIDE 10 MG/1
TABLET ORAL
Qty: 180 TABLET | Refills: 4 | Status: SHIPPED | OUTPATIENT
Start: 2022-09-14

## 2022-09-14 ASSESSMENT — ENCOUNTER SYMPTOMS
EYES NEGATIVE: 1
RESPIRATORY NEGATIVE: 1
ALLERGIC/IMMUNOLOGIC NEGATIVE: 1

## 2022-09-14 NOTE — PROGRESS NOTES
[facility], [address] [facility], [facility] [address]  Phone: [phone] Fax [phone]  Dr. Christian Coats      9/14/2022  Aneudy Holt     Patient is referred by the following provider for consultation regarding as below:       I reviewed the available records and notes and have examined patient with the following findings:     Chief Complaint:  Chief Complaint   Patient presents with    Establish Care    Neurologic Problem     dizziness          HPI: This is a right handed [de-identified] y.o.  male here with his Neath niece and his wife's wife being the primary historian and the primary caregiver. This gentleman could not survive without his wife taking full care of him. Symptoms really started several years ago and progressively gotten worse and they met milestones in December 2021 where he no longer felt he could operate a motor vehicle because he did not feel comfortable. Prior to that he refused to leave and leave the house without having exact directions to where he was going. At the same time he was having short-term memory loss and progressively gotten worse through point where he was repeating questions and not able to recall anything of short-term. He has a aunt who had dementia and a couple cousins who had dementia. He did have a CAT scan done on 4-4-2022 showing moderate volume loss but he really does not describe NPH-like symptoms. He also has had an ultrasound of his carotid arteries that was negative. He has had a few episodes of lightheadedness when he goes from sitting to standing position which certainly could be associated with orthostatic hypotension. And there is some report of him having a diagnosis of monoclonal gammopathy of undetermined significance.   This patient is gotten to the point cognitively where his wife in the morning has to walk him through how to prepare to get ready for the day which includes picking out his close minimally helping him Social Determinants of Health     Financial Resource Strain: Not on file   Food Insecurity: Not on file   Transportation Needs: Not on file   Physical Activity: Not on file   Stress: Not on file   Social Connections: Not on file   Intimate Partner Violence: Not on file   Housing Stability: Not on file       Family History:   Family History   Problem Relation Age of Onset    Heart Attack Father 47       Current Outpatient Medications on File Prior to Visit   Medication Sig Dispense Refill    finasteride (PROSCAR) 5 MG tablet Take 5 mg by mouth in the morning. Multiple Vitamin (MULTIVITAMIN ADULT PO) Take by mouth daily      aspirin 81 MG EC tablet Take by mouth daily      vitamin D3 (CHOLECALCIFEROL) 125 MCG (5000 UT) TABS tablet Take by mouth daily      coenzyme Q10 100 MG CAPS capsule Take 100 mg by mouth daily      fluticasone (FLONASE) 50 MCG/ACT nasal spray 2 sprays by Nasal route daily      nitroGLYCERIN (NITROSTAT) 0.4 MG SL tablet Place under the tongue      pravastatin (PRAVACHOL) 20 MG tablet Take 20 mg by mouth       No current facility-administered medications on file prior to visit. No Known Allergies    Review of Systems:  Review of Systems   Constitutional: Negative. HENT: Negative. Eyes: Negative. Respiratory: Negative. Cardiovascular: Negative. Endocrine: Negative. Genitourinary: Negative. Allergic/Immunologic: Negative. Neurological:         Memory loss    No flowsheet data found. No flowsheet data found. Vitals:    09/14/22 0854   BP: 138/87   Site: Left Upper Arm   Position: Sitting   Pulse: 77   Weight: 133 lb (60.3 kg)   Height: 6' (1.829 m)        Physical Exam  Constitutional:       Appearance: Normal appearance. HENT:      Head: Normocephalic and atraumatic. Eyes:      Extraocular Movements: Extraocular movements intact and EOM normal.      Pupils: Pupils are equal, round, and reactive to light.    Cardiovascular:      Rate and Rhythm: Normal rate and regular rhythm. Pulses: Normal pulses. Pulmonary:      Effort: Pulmonary effort is normal.   Abdominal:      General: Abdomen is flat. Neurological:      Mental Status: He is alert. Gait: Gait is intact. Deep Tendon Reflexes:      Reflex Scores:       Tricep reflexes are 1+ on the right side and 1+ on the left side. Bicep reflexes are 1+ on the right side and 1+ on the left side. Brachioradialis reflexes are 1+ on the right side and 1+ on the left side. Patellar reflexes are 1+ on the right side and 1+ on the left side. Achilles reflexes are 1+ on the right side and 1+ on the left side. Neurologic Exam     Mental Status   Attention: decreased. Concentration: decreased. Level of consciousness: alert  Knowledge: poor. He is extremely sharp with his long-term memory Praveen Hernández he can recall 911 he can recall Heriberto Cisse he knows what he is to do for living he knows where he is born and raised rather quickly and sharp. He does not know the year he does not know the month he does not know the season he does not know his niece's name does recall his wife's name any does know the president [place]. The more way asked more frustrated he seem to get. Cranial Nerves     CN II   Visual fields full to confrontation. CN III, IV, VI   Pupils are equal, round, and reactive to light. Extraocular motions are normal.     CN VII   Facial expression full, symmetric.      Motor Exam   Right arm tone: normal  Left arm tone: normal  Right leg tone: normal  Left leg tone: normal    Gait, Coordination, and Reflexes     Gait  Gait: normal    Tremor   Resting tremor: absent  Intention tremor: absent  Action tremor: absent    Reflexes   Right brachioradialis: 1+  Left brachioradialis: 1+  Right biceps: 1+  Left biceps: 1+  Right triceps: 1+  Left triceps: 1+  Right patellar: 1+  Left patellar: 1+  Right achilles: 1+  Left achilles: 1+        Assessment   Assessment / Plan: Moe was seen today for establish care and neurologic problem. Diagnoses and all orders for this visit:    Alzheimer's dementia without behavioral disturbance, unspecified timing of dementia onset ([facility] [address] the patient has had progressive memory decline over I suspect more than just a year probably 2+ years. He met a milestone in December 2021 where he no longer could operate a motor vehicle and did not feel comfortable and took himself off the road. But things led up to that. And he is advanced he needs full care for the most part he could not survive without his wife. Monalisa start him on Namenda and get him up to 10 twice a day when I see him in follow-up I am going to add Aricept on board. Other orders  -     memantine (NAMENDA) 10 MG tablet; Take half bid for one month than continue with one bid        The Diagnosis and differential diagnostic considerations, and Rx Tx were reviewed with the patient at length. No orders of the defined types were placed in this encounter. I have spent greater than 50% of visit discussing and counseling of patient  for treatment and diagnostic plan review. Total time 50 min     . Notes: Patient is to continue all medications as directed by prescribing physicians. Continuations on today's visit are made based on the patient's report of current medications.              Dr. Usman Holman  Consultation Neurology, Neurodiagnostics and Neurotherapeutics  Neuroelectrophysiology, EEG, EMG  [facility] Neurology  [address] [place] [address], [address] [facility]  Phone:  [phone]  Fax:   [phone]